# Patient Record
Sex: MALE | Race: OTHER | HISPANIC OR LATINO | URBAN - METROPOLITAN AREA
[De-identification: names, ages, dates, MRNs, and addresses within clinical notes are randomized per-mention and may not be internally consistent; named-entity substitution may affect disease eponyms.]

---

## 2021-07-06 VITALS
OXYGEN SATURATION: 99 % | HEART RATE: 60 BPM | DIASTOLIC BLOOD PRESSURE: 90 MMHG | TEMPERATURE: 97 F | SYSTOLIC BLOOD PRESSURE: 166 MMHG | RESPIRATION RATE: 16 BRPM

## 2021-07-06 NOTE — H&P ADULT - NSICDXPASTMEDICALHX_GEN_ALL_CORE_FT
PAST MEDICAL HISTORY:  DM (diabetes mellitus)     H/O carotid artery stenosis     Hyperlipidemia     Hypertension     VISHNU (iron deficiency anemia)

## 2021-07-06 NOTE — H&P ADULT - HISTORY OF PRESENT ILLNESS
Skeleton  HYDRATIOn  PRE-OP TAVR/SAVR, ?SCHEDULED    Verify meds    Cardiologist: Dr. Montiel  Covid:  Escort:  Pharmacy: [facility] [address], [place], [address]    77 yo M with PMHx HTN, HLD, IDDM with hyperglycemia (HgbA1C 9.4% on 5/20/21), chronic diastolic CHF (recent hospitalization from [facility] on_____ for acute on chronic diastolic CHF exacerbation), severe AS (JESU 1.0), carotid artery stenosis s/p carotid stent x 2 on 1/7/19 in distal common carotid artery) PAD (R/L SFA 20-50%, R/L pop 20-50% by LE arterial duplex 3/31/21), CKD (Cr. 3.3 5/20/21), VISHNU (H/H 9.4/23.3, total iron 48, %iron sat 14.2% on 4/1/21), hypothyroidism who presented to Cardiologist Dr. Montiel c/o non-radiating SSCP described as __ and of _/10 intensity with associated fatigue/IRIZARRY/weakness upon ambulation of ___ city blocks, resolving with rest, over past ____.     Denies dizziness, diaphoresis, palpitations, LE edema, orthopnea, PND, syncope, N/V, abdominal pain.     ECHO 3/31/21: LVEF 53%, mild AI/PA, probable LV diastolic dysfunction of impaired relaxation type, AS with mod-severe AS (JESU 1.0cm2). Carotid duplex 3/31/21: ANGEL LUIS 16-49% in ANGEL LUIS, 16-49% in LICA.     In light of pt's risk factors, CCS Anginal Class ___ Sx, pt referred for [facility] for pre-op w/u for possible TAVR/SAVR.  Verify meds    History obtained via Deena Rileyginger (daughter, reliable)  WILL NEED HYDRATION *** Alerted GLADIS Hobson  PRE-OP TAVR/SAVR, NOT SCHEDULED    Cardiologist: Dr. Montiel  Covid: Vaccinated x2 (3/2021)  Escort: Daughter  Pharmacy: [facility] [address], [place], [address]    75 yo M with PMHx HTN, HLD, IDDM with hyperglycemia (HgbA1C 9.4% on 5/20/21),  CKD stage unknown (Cr 2.35 on 3/12/21, Cr 3.3 5/20/21, Cr 4.26 6/25/21), chronic diastolic CHF (recent hospitalization @ [facility] 6/2021 for acute on chronic diastolic CHF exacerbation & VERONICA on CKD s/p IV diuresis), severe AS (JESU 1.0cm2), carotid artery stenosis s/p carotid stent x 2 in distal common carotid artery on 1/7/19), PAD (R/L SFA 20-50%, R/L pop 20-50% by LE arterial duplex 3/31/21), VISHNU of unclear etiology (H/H 9.4/23.3, total iron 48, %iron sat 14.2% on 4/1/21, s/p 1uPRBCs during previous hospitalization for presumed anemia of chronic kidney disease, last colonoscopy 5 years ago which was unremarkable per daughter), hypothyroidism who presented to Cardiologist Dr. Montiel c/o SOB occurring independent of exertion prior to hospitalization but this has now resolved s/p IV diuresis. Pt endorses feeling of generalized fatigue and intermittent LE edema but this has improved since hospitalization. Denies dizziness, diaphoresis, palpitations, orthopnea, PND, syncope, N/V, abdominal pain, BRBPR, melena.  ECHO 3/31/21: LVEF 53%, mild AI/SC, probable LV diastolic dysfunction of impaired relaxation type, AS with mod-severe AS (JESU 1.0cm2). Carotid duplex 3/31/21: ANGEL LUIS 16-49% in ANGEL LUIS, 16-49% in LICA. In light of pt's risk factors, pt referred for [facility] for pre-op w/u for possible TAVR/SAVR.      History obtained via Deena Wolfe (daughter, reliable)  WILL NEED HYDRATION *** Alerted GLADIS Hobson  PRE-OP TAVR/SAVR, NOT SCHEDULED    Cardiologist: Dr. Montiel  Covid: Vaccinated x2 (3/2021)  Escort: Daughter  Pharmacy: [facility] [address], [place], [address]    75 yo M with PMHx HTN, HLD, IDDM with hyperglycemia (HgbA1C 9.4% on 5/20/21),  CKD stage unknown (Cr 2.35 on 3/12/21, Cr 3.3 5/20/21, Cr 4.26 6/25/21), chronic diastolic CHF (recent hospitalization @ [facility] 6/2021 for acute on chronic diastolic CHF exacerbation & VERONICA on CKD s/p IV diuresis), severe AS (JESU 1.0cm2), carotid artery stenosis s/p carotid stent x 2 in distal common carotid artery on 1/7/19), PAD (R/L SFA 20-50%, R/L pop 20-50% by LE arterial duplex 3/31/21), VISHNU of unclear etiology (H/H 9.4/23.3, total iron 48, %iron sat 14.2% on 4/1/21, s/p 1uPRBCs during previous hospitalization for presumed anemia of chronic kidney disease, last colonoscopy 5 years ago which was unremarkable per daughter), hypothyroidism who presented to Cardiologist Dr. Montiel c/o SOB occurring independent of exertion prior to hospitalization but this has now resolved s/p IV diuresis. Pt endorses feeling of generalized fatigue and intermittent LE edema but this has improved since hospitalization. Denies dizziness, diaphoresis, palpitations, orthopnea, PND, syncope, N/V, abdominal pain, BRBPR, melena.  ECHO 3/31/21: LVEF 53%, mild AI/HI, probable LV diastolic dysfunction of impaired relaxation type, AS with mod-severe AS (JESU 1.0cm2). Carotid duplex 3/31/21: ANGEL LUIS 16-49% in ANGEL LUIS, 16-49% in LICA. In light of pt's risk factors, pt referred for [facility] for pre-op w/u for possible TAVR/SAVR.      History obtained via Deena Wolfe (daughter, reliable)  WILL NEED HYDRATION *** Alerted GLADIS Hobson  PRE-OP TAVR/SAVR, NOT SCHEDULED    Cardiologist: Dr. Montiel  Covid: Vaccinated x2 (3/2021)  Escort: Daughter  Pharmacy: [facility] [address] [place], [address]    75 y/o Frisian/English Speaking M with PMHx HTN, HLD, IDDM with hyperglycemia (HgbA1C 9.4% on 5/20/21),  CKD stage unknown (Cr 2.35 on 3/12/21, Cr 3.3 5/20/21, Cr 4.26 6/25/21), chronic diastolic CHF (recent hospitalization @ [facility] 6/2021 for acute on chronic diastolic CHF exacerbation & VERONICA on CKD s/p IV diuresis), severe AS (JESU 1.0cm2), carotid artery stenosis s/p carotid stent x 2 in distal common carotid artery on 1/7/19), PAD (R/L SFA 20-50%, R/L pop 20-50% by LE arterial duplex 3/31/21), VISHNU of unclear etiology (H/H 9.4/23.3, total iron 48, %iron sat 14.2% on 4/1/21, s/p 1uPRBCs during previous hospitalization for presumed anemia of chronic kidney disease, last colonoscopy 5 years ago which was unremarkable per daughter), hypothyroidism who presented to Cardiologist Dr. Montiel c/o SOB occurring independent of exertion prior to hospitalization but this has now resolved s/p IV diuresis. Pt endorses feeling of generalized fatigue and intermittent LE edema but this has improved since hospitalization. Denies dizziness, diaphoresis, palpitations, orthopnea, PND, syncope, N/V, abdominal pain, BRBPR, melena.  ECHO 3/31/21: LVEF 53%, mild AI/KY, probable LV diastolic dysfunction of impaired relaxation type, AS with mod-severe AS (JESU 1.0cm2). Carotid duplex 3/31/21: ANGEL LUIS 16-49% in ANGEL LUIS, 16-49% in LICA. In light of pt's risk factors, pt referred for [facility] for pre-op w/u for possible TAVR/SAVR.      History obtained via Deena Wolfe (daughter, reliable)  PRE-OP TAVR/SAVR, NOT SCHEDULED    Cardiologist: Dr. Montiel  Covid: Vaccinated x2 (3/2021)  Escort: Daughter  Pharmacy: [facility] [address] [place], [address]    75 y/o Kazakh/English Speaking M with PMHx HTN, HLD, IDDM with hyperglycemia (HgbA1C 9.4% on 5/20/21),  CKD stage unknown (Cr 2.35 on 3/12/21, Cr 3.3 5/20/21, Cr 4.26 6/25/21), chronic diastolic CHF (recent hospitalization @ [facility] 6/2021 for acute on chronic diastolic CHF exacerbation & VERONICA on CKD s/p IV diuresis), severe AS (JESU 1.0cm2), carotid artery stenosis s/p carotid stent x 2 in distal common carotid artery on 1/7/19), PAD (R/L SFA 20-50%, R/L pop 20-50% by LE arterial duplex 3/31/21), VISHNU of unclear etiology (H/H 9.4/23.3, total iron 48, %iron sat 14.2% on 4/1/21, s/p 1uPRBCs during previous hospitalization for presumed anemia of chronic kidney disease, last colonoscopy 5 years ago which was unremarkable per daughter), hypothyroidism who presented to Cardiologist Dr. Montiel c/o SOB occurring independent of exertion prior to hospitalization but this has now resolved s/p IV diuresis. Pt endorses feeling of generalized fatigue and intermittent LE edema but this has improved since hospitalization. Denies dizziness, diaphoresis, palpitations, orthopnea, PND, syncope, N/V, abdominal pain, BRBPR, melena.  ECHO 3/31/21: LVEF 53%, mild AI/MI, probable LV diastolic dysfunction of impaired relaxation type, AS with mod-severe AS (JESU 1.0cm2). Carotid duplex 3/31/21: ANGEL LUIS 16-49% in ANGEL LUIS, 16-49% in LICA. In light of pt's risk factors, pt referred for [facility] for pre-op w/u for possible TAVR/SAVR.

## 2021-07-06 NOTE — H&P ADULT - ASSESSMENT
75 yo M with PMHx HTN, HLD, IDDM with hyperglycemia (HgbA1C 9.4% on 5/20/21),  CKD stage unknown (Cr 2.35 on 3/12/21, Cr 3.3 5/20/21, Cr 4.26 6/25/21), chronic diastolic CHF (recent hospitalization @ [facility] 6/2021 for acute on chronic diastolic CHF exacerbation & VERONICA on CKD s/p IV diuresis), severe AS (JESU 1.0cm2), carotid artery stenosis s/p carotid stent x 2 in distal common carotid artery on 1/7/19), PAD (R/L SFA 20-50%, R/L pop 20-50% by LE arterial duplex 3/31/21), VISHNU of unclear etiology (H/H 9.4/23.3, total iron 48, %iron sat 14.2% on 4/1/21, s/p 1uPRBCs during previous hospitalization for presumed anemia of chronic kidney disease, last colonoscopy 5 years ago which was unremarkable per daughter), hypothyroidism who is referred for [facility] for pre-op w/u for possible TAVR/SAVR.     H/H . Pt denies bleeding, GI bleeding, hematemesis, hematuria, BRBPR or melena . ASA … and Plavix … pre-cath.  Cr. IV NS@ cc/hr pre-cath.    Risks & benefits of procedure and alternative therapy have been explained to the patient including but not limited to: allergic reaction, bleeding w/possible need for blood transfusion, infection, renal and vascular compromise, limb damage, arrhythmia, stroke, vessel dissection/perforation, Myocardial infarction, emergent CABG. Informed consent obtained and in chart   77 yo Moldovan/English M with PMHx HTN, HLD, IDDM with hyperglycemia (HgbA1C 9.4% on 5/20/21),  CKD stage unknown (Cr 2.35 on 3/12/21, Cr 3.3 5/20/21, Cr 4.26 6/25/21), chronic diastolic CHF (recent hospitalization @ [facility] 6/2021 for acute on chronic diastolic CHF exacerbation & VERONICA on CKD s/p IV diuresis), severe AS (JESU 1.0cm2), carotid artery stenosis s/p carotid stent x 2 in distal common carotid artery on 1/7/19), PAD (R/L SFA 20-50%, R/L pop 20-50% by LE arterial duplex 3/31/21), VISHNU of unclear etiology (H/H 9.4/23.3, total iron 48, %iron sat 14.2% on 4/1/21, s/p 1uPRBCs during previous hospitalization for presumed anemia of chronic kidney disease, last colonoscopy 5 years ago which was unremarkable per daughter), hypothyroidism who is referred for [facility] for pre-op w/u for possible TAVR/SAVR.     H/H 10.7/32.4. Pt denies bleeding, GI bleeding, hematemesis, hematuria, BRBPR or melena .  Pt. reports being compliant with home DAPT. Pt. given ASA 81 mg PO X 1 and Plavix 75 mg PO x 1 pre-cath.  Cr. 4.03; no  fluids given 2/2 severe AS  ECHO ordered as per Dr. Cole. f/u     Risks & benefits of procedure and alternative therapy have been explained to the patient including but not limited to: allergic reaction, bleeding w/possible need for blood transfusion, infection, renal and vascular compromise, limb damage, arrhythmia, stroke, vessel dissection/perforation, Myocardial infarction, emergent CABG. Informed consent obtained and in chart   77 yo Citizen of [place]/English M with PMHx HTN, HLD, IDDM with hyperglycemia (HgbA1C 9.4% on 5/20/21),  CKD stage unknown (Cr 2.35 on 3/12/21, Cr 3.3 5/20/21, Cr 4.26 6/25/21), chronic diastolic CHF (recent hospitalization @ [facility] 6/2021 for acute on chronic diastolic CHF exacerbation & VERONICA on CKD s/p IV diuresis), severe AS (JESU 1.0cm2), carotid artery stenosis s/p carotid stent x 2 in distal common carotid artery on 1/7/19), PAD (R/L SFA 20-50%, R/L pop 20-50% by LE arterial duplex 3/31/21), VISHNU of unclear etiology (H/H 9.4/23.3, total iron 48, %iron sat 14.2% on 4/1/21, s/p 1uPRBCs during previous hospitalization for presumed anemia of chronic kidney disease, last colonoscopy 5 years ago which was unremarkable per daughter), hypothyroidism who is referred for [facility] for pre-op w/u for possible TAVR/SAVR.     H/H 10.7/32.4. Pt denies bleeding, GI bleeding, hematemesis, hematuria, BRBPR or melena .  Pt. reports being compliant with home DAPT. Pt. given ASA 81 mg PO X 1 and Plavix 75 mg PO x 1 pre-cath.  Cr. 4.03; no  fluids given 2/2 severe AS  ECHO ordered as per Dr. Cole. f/u   WBC 11; pt afebrile and denies any URI/UTI sx    Risks & benefits of procedure and alternative therapy have been explained to the patient including but not limited to: allergic reaction, bleeding w/possible need for blood transfusion, infection, renal and vascular compromise, limb damage, arrhythmia, stroke, vessel dissection/perforation, Myocardial infarction, emergent CABG. Informed consent obtained and in chart

## 2021-07-06 NOTE — H&P ADULT - NSHPLABSRESULTS_GEN_ALL_CORE
10.7   11.35 )-----------( 297      ( 07 Jul 2021 08:12 )             32.4   07-07    144  |  100  |  105<H>  ----------------------------<  76  4.1   |  28  |  4.03<H>    Ca    10.2      07 Jul 2021 08:12  Mg     2.1     07-07    TPro  8.1  /  Alb  4.5  /  TBili  0.4  /  DBili  x   /  AST  34  /  ALT  43  /  AlkPhos  168<H>  07-07  PT/INR - ( 07 Jul 2021 08:12 )   PT: 12.0 sec;   INR: 1.00          PTT - ( 07 Jul 2021 08:12 )  PTT:32.3 sec

## 2021-07-07 ENCOUNTER — OUTPATIENT (OUTPATIENT)
Dept: OUTPATIENT SERVICES | Facility: HOSPITAL | Age: 76
LOS: 1 days | Discharge: HOME | End: 2021-07-07
Payer: MEDICARE

## 2021-07-07 DIAGNOSIS — Z86.79 PERSONAL HISTORY OF OTHER DISEASES OF THE CIRCULATORY SYSTEM: Chronic | ICD-10-CM

## 2021-07-07 LAB
A1C WITH ESTIMATED AVERAGE GLUCOSE RESULT: 7.3 % — HIGH (ref 4–5.6)
ALBUMIN SERPL ELPH-MCNC: 4.5 G/DL — SIGNIFICANT CHANGE UP (ref 3.3–5)
ALP SERPL-CCNC: 168 U/L — HIGH (ref 40–120)
ALT FLD-CCNC: 43 U/L — SIGNIFICANT CHANGE UP (ref 10–45)
ANION GAP SERPL CALC-SCNC: 16 MMOL/L — SIGNIFICANT CHANGE UP (ref 5–17)
APTT BLD: 32.3 SEC — SIGNIFICANT CHANGE UP (ref 27.5–35.5)
AST SERPL-CCNC: 34 U/L — SIGNIFICANT CHANGE UP (ref 10–40)
BASOPHILS # BLD AUTO: 0.06 K/UL — SIGNIFICANT CHANGE UP (ref 0–0.2)
BASOPHILS NFR BLD AUTO: 0.5 % — SIGNIFICANT CHANGE UP (ref 0–2)
BILIRUB SERPL-MCNC: 0.4 MG/DL — SIGNIFICANT CHANGE UP (ref 0.2–1.2)
BUN SERPL-MCNC: 105 MG/DL — HIGH (ref 7–23)
CALCIUM SERPL-MCNC: 10.2 MG/DL — SIGNIFICANT CHANGE UP (ref 8.4–10.5)
CHLORIDE SERPL-SCNC: 100 MMOL/L — SIGNIFICANT CHANGE UP (ref 96–108)
CHOLEST SERPL-MCNC: 170 MG/DL — SIGNIFICANT CHANGE UP
CK MB CFR SERPL CALC: 2.6 NG/ML — SIGNIFICANT CHANGE UP (ref 0–6.7)
CK SERPL-CCNC: 153 U/L — SIGNIFICANT CHANGE UP (ref 30–200)
CO2 SERPL-SCNC: 28 MMOL/L — SIGNIFICANT CHANGE UP (ref 22–31)
CREAT SERPL-MCNC: 4.03 MG/DL — HIGH (ref 0.5–1.3)
EOSINOPHIL # BLD AUTO: 0.4 K/UL — SIGNIFICANT CHANGE UP (ref 0–0.5)
EOSINOPHIL NFR BLD AUTO: 3.5 % — SIGNIFICANT CHANGE UP (ref 0–6)
ESTIMATED AVERAGE GLUCOSE: 163 MG/DL — HIGH (ref 68–114)
GLUCOSE BLDC GLUCOMTR-MCNC: 111 MG/DL — HIGH (ref 70–99)
GLUCOSE SERPL-MCNC: 76 MG/DL — SIGNIFICANT CHANGE UP (ref 70–99)
HCT VFR BLD CALC: 32.4 % — LOW (ref 39–50)
HDLC SERPL-MCNC: 55 MG/DL — SIGNIFICANT CHANGE UP
HGB BLD-MCNC: 10.7 G/DL — LOW (ref 13–17)
IMM GRANULOCYTES NFR BLD AUTO: 0.7 % — SIGNIFICANT CHANGE UP (ref 0–1.5)
INR BLD: 1 — SIGNIFICANT CHANGE UP (ref 0.88–1.16)
LIPID PNL WITH DIRECT LDL SERPL: 97 MG/DL — SIGNIFICANT CHANGE UP
LYMPHOCYTES # BLD AUTO: 1.3 K/UL — SIGNIFICANT CHANGE UP (ref 1–3.3)
LYMPHOCYTES # BLD AUTO: 11.5 % — LOW (ref 13–44)
MAGNESIUM SERPL-MCNC: 2.1 MG/DL — SIGNIFICANT CHANGE UP (ref 1.6–2.6)
MCHC RBC-ENTMCNC: 30.5 PG — SIGNIFICANT CHANGE UP (ref 27–34)
MCHC RBC-ENTMCNC: 33 GM/DL — SIGNIFICANT CHANGE UP (ref 32–36)
MCV RBC AUTO: 92.3 FL — SIGNIFICANT CHANGE UP (ref 80–100)
MONOCYTES # BLD AUTO: 0.86 K/UL — SIGNIFICANT CHANGE UP (ref 0–0.9)
MONOCYTES NFR BLD AUTO: 7.6 % — SIGNIFICANT CHANGE UP (ref 2–14)
NEUTROPHILS # BLD AUTO: 8.65 K/UL — HIGH (ref 1.8–7.4)
NEUTROPHILS NFR BLD AUTO: 76.2 % — SIGNIFICANT CHANGE UP (ref 43–77)
NON HDL CHOLESTEROL: 115 MG/DL — SIGNIFICANT CHANGE UP
NRBC # BLD: 0 /100 WBCS — SIGNIFICANT CHANGE UP (ref 0–0)
PLATELET # BLD AUTO: 297 K/UL — SIGNIFICANT CHANGE UP (ref 150–400)
POTASSIUM SERPL-MCNC: 4.1 MMOL/L — SIGNIFICANT CHANGE UP (ref 3.5–5.3)
POTASSIUM SERPL-SCNC: 4.1 MMOL/L — SIGNIFICANT CHANGE UP (ref 3.5–5.3)
PROT SERPL-MCNC: 8.1 G/DL — SIGNIFICANT CHANGE UP (ref 6–8.3)
PROTHROM AB SERPL-ACNC: 12 SEC — SIGNIFICANT CHANGE UP (ref 10.6–13.6)
RBC # BLD: 3.51 M/UL — LOW (ref 4.2–5.8)
RBC # FLD: 12.7 % — SIGNIFICANT CHANGE UP (ref 10.3–14.5)
SODIUM SERPL-SCNC: 144 MMOL/L — SIGNIFICANT CHANGE UP (ref 135–145)
TRIGL SERPL-MCNC: 92 MG/DL — SIGNIFICANT CHANGE UP
WBC # BLD: 11.35 K/UL — HIGH (ref 3.8–10.5)
WBC # FLD AUTO: 11.35 K/UL — HIGH (ref 3.8–10.5)

## 2021-07-07 PROCEDURE — 85025 COMPLETE CBC W/AUTO DIFF WBC: CPT

## 2021-07-07 PROCEDURE — 80061 LIPID PANEL: CPT

## 2021-07-07 PROCEDURE — 93005 ELECTROCARDIOGRAM TRACING: CPT

## 2021-07-07 PROCEDURE — 83036 HEMOGLOBIN GLYCOSYLATED A1C: CPT

## 2021-07-07 PROCEDURE — 93454 CORONARY ARTERY ANGIO S&I: CPT | Mod: 26

## 2021-07-07 PROCEDURE — 82550 ASSAY OF CK (CPK): CPT

## 2021-07-07 PROCEDURE — 82962 GLUCOSE BLOOD TEST: CPT

## 2021-07-07 PROCEDURE — C1887: CPT

## 2021-07-07 PROCEDURE — C1769: CPT

## 2021-07-07 PROCEDURE — 80053 COMPREHEN METABOLIC PANEL: CPT

## 2021-07-07 PROCEDURE — 85730 THROMBOPLASTIN TIME PARTIAL: CPT

## 2021-07-07 PROCEDURE — 99203 OFFICE O/P NEW LOW 30 MIN: CPT

## 2021-07-07 PROCEDURE — 93306 TTE W/DOPPLER COMPLETE: CPT | Mod: 26

## 2021-07-07 PROCEDURE — 83735 ASSAY OF MAGNESIUM: CPT

## 2021-07-07 PROCEDURE — C1894: CPT

## 2021-07-07 PROCEDURE — 93306 TTE W/DOPPLER COMPLETE: CPT

## 2021-07-07 PROCEDURE — 93010 ELECTROCARDIOGRAM REPORT: CPT

## 2021-07-07 PROCEDURE — 85610 PROTHROMBIN TIME: CPT

## 2021-07-07 PROCEDURE — 82553 CREATINE MB FRACTION: CPT

## 2021-07-07 PROCEDURE — 93454 CORONARY ARTERY ANGIO S&I: CPT

## 2021-07-07 RX ORDER — LABETALOL HCL 100 MG
1 TABLET ORAL
Qty: 0 | Refills: 0 | DISCHARGE

## 2021-07-07 RX ORDER — CLOPIDOGREL BISULFATE 75 MG/1
75 TABLET, FILM COATED ORAL ONCE
Refills: 0 | Status: COMPLETED | OUTPATIENT
Start: 2021-07-07 | End: 2021-07-07

## 2021-07-07 RX ORDER — INSULIN DEGLUDEC 100 U/ML
0 INJECTION, SOLUTION SUBCUTANEOUS
Qty: 0 | Refills: 0 | DISCHARGE

## 2021-07-07 RX ORDER — HYDRALAZINE HCL 50 MG
1 TABLET ORAL
Qty: 0 | Refills: 0 | DISCHARGE

## 2021-07-07 RX ORDER — CHLORHEXIDINE GLUCONATE 213 G/1000ML
1 SOLUTION TOPICAL ONCE
Refills: 0 | Status: DISCONTINUED | OUTPATIENT
Start: 2021-07-07 | End: 2021-07-07

## 2021-07-07 RX ORDER — ASPIRIN/CALCIUM CARB/MAGNESIUM 324 MG
81 TABLET ORAL ONCE
Refills: 0 | Status: COMPLETED | OUTPATIENT
Start: 2021-07-07 | End: 2021-07-07

## 2021-07-07 RX ADMIN — Medication 81 MILLIGRAM(S): at 10:11

## 2021-07-07 RX ADMIN — CLOPIDOGREL BISULFATE 75 MILLIGRAM(S): 75 TABLET, FILM COATED ORAL at 10:16

## 2021-07-07 NOTE — PROGRESS NOTE ADULT - SUBJECTIVE AND OBJECTIVE BOX
Interventional Cardiology [facility]CH [facility] Discharge Note    Patient without complaints. Ambulated and voided without difficulties    Afebrile, VSS    Ext:    		Right  Radial : no   hematoma,   no  bleeding, dressing; C/D/I      Pulses:    intact RAD to baseline     A/P:  77 yo Syriac/English M with PMHx HTN, HLD, IDDM with hyperglycemia (HgbA1C 9.4% on 5/20/21),  CKD stage unknown (Cr 2.35 on 3/12/21, Cr 3.3 5/20/21, Cr 4.26 6/25/21), chronic diastolic CHF (recent hospitalization @ [facility] 6/2021 for acute on chronic diastolic CHF exacerbation & VERONICA on CKD s/p IV diuresis), severe AS (JESU 1.0cm2), carotid artery stenosis s/p carotid stent x 2 in distal common carotid artery on 1/7/19), PAD (R/L SFA 20-50%, R/L pop 20-50% by LE arterial duplex 3/31/21), VISHNU of unclear etiology (H/H 9.4/23.3, total iron 48, %iron sat 14.2% on 4/1/21, s/p 1uPRBCs during previous hospitalization for presumed anemia of chronic kidney disease, last colonoscopy 5 years ago which was unremarkable per daughter), hypothyroidism who is referred for [facility] for pre-op w/u for possible TAVR/SAVR.       Pt now s/p diagnostic cardiac catheterization on 7/7/21 revealing prox RCA 80% OM2 70% small to medium sized. Otherwise nonobstructive CAD. Right radial access.     1.	Stable for discharge as per attending Dr. Mosher after bed rest, pt voids, groin/wrist stable and 30 minutes of ambulation.  2.	Follow-up with PMD/Cardiologist Dr Cole in 1-2 weeks  3.	Discharged forms signed and copies in chart

## 2021-07-07 NOTE — PROGRESS NOTE ADULT - SUBJECTIVE AND OBJECTIVE BOX
Interventional Cardiology Radial band Removal Note    s/p Heparin 	    Pt without complaints.  VSS.    Right Radial access site D-Stat Hemoband in place, no hematoma, no bled  Radial pulse: +2    Hemostasis achieved with manual release of hemoband.    __no_  Vasovagal reaction.    Meds given:    Right/Left Radial access site  _no____ hematoma, ____no__ bleed  Radial pulse: +2    A/P:  s/p Dx Coronary Angiogram  -	continue to monitor  -	-OOB as tolerated  -	Post Procedure Instructions given  -                   Call [phone] if any access site issues. Interventional Cardiology Radial band Removal Note    s/p Heparin 	    Pt without complaints.  VSS.    Right Radial access site D-Stat Hemoband in place, no hematoma, no bled  Radial pulse: +2    Hemostasis achieved with manual release of hemoband.    __no_  Vasovagal reaction.    Meds given: none    Right Radial access site  _no____ hematoma, ____no__ bleed  Radial pulse: +2    A/P:  s/p Dx Coronary Angiogram  -	continue to monitor  -	-OOB as tolerated  -	Post Procedure Instructions given  -                   Call [phone] if any access site issues.

## 2021-07-07 NOTE — CONSULT NOTE ADULT - ASSESSMENT
77 y/o M with CKD IV, HTN, HLD< IDDM, PAD, ROWENA, chronic diastolic heart failure and severe AS.  Given advanced CKD, pt is high risk for dialysis. He will need Structural CT scan, and will plan for that at [place] pending Renal input.  Given age/comorbidities pt is high risk for surgery, so feel that pursuing TAVR workup is warranted.  -Structural CT scan  -obtain carotid duplex from Tiana's office  -We discussed options for valvular intervention.  -WIll discuss with Renal, Dr. Mosher and Dr. Montiel  >80 min spent

## 2021-07-07 NOTE — CONSULT NOTE ADULT - SUBJECTIVE AND OBJECTIVE BOX
Patient is a 76y old  Male who presents with a chief complaint of cardiac catheterization (07 Jul 2021 14:01)      HPI:      History obtained via Deenaariel Goinskevinginger (daughter, reliable)  PRE-OP TAVR/SAVR, NOT SCHEDULED    Cardiologist: Dr. Montiel  Covid: Vaccinated x2 (3/2021)  Escort: Daughter  Pharmacy: [facility] [address] [place], [address]    75 y/o Greek/English Speaking M with PMHx HTN, HLD, IDDM, CKD (Stage IV or V, Creatinine ~4), chronic diastolic CHF (recent hospitalization @ [facility] 6/2021 for acute on chronic diastolic CHF exacerbation & VERONICA on CKD s/p IV diuresis), severe AS, carotid artery stenosis s/p carotid stent x 2 in distal common carotid artery on 1/7/19), PAD (R/L SFA 20-50%, R/L pop 20-50% by LE arterial duplex 3/31/21), anemia, hypothyroidism who presented with NYHA III symptoms. Pt endorses feeling of generalized fatigue and intermittent LE edema but this has improved since hospitalization. Denies dizziness, diaphoresis, palpitations, orthopnea, PND, syncope, N/V, abdominal pain, BRBPR, melena.  ECHO 3/31/21: LVEF 53%, mild AI/LA, probable LV diastolic dysfunction of impaired relaxation type, AS with mod-severe AS (JESU 1.0cm2). Carotid duplex 3/31/21: ANGEL LUIS 16-49% in ANGEL LUIS, 16-49% in LICA. In light of pt's risk factors, pt referred for [facility] for pre-op w/u for possible TAVR/SAVR.  He is s/p cardiac cath today revealing prox RCA 80% OM2 70% small to medium sized. Otherwise nonobstructive CAD. Right radial access.     PAST MEDICAL & SURGICAL HISTORY:  Hypertension    Hyperlipidemia    DM (diabetes mellitus)    H/O carotid artery stenosis    VISHNU (iron deficiency anemia)    H/O carotid artery stenosis  s/p carotid stent x 2 in 2019        Review of system:  As per HPI    No Known Allergies      FAMILY HISTORY:  FH: diabetes mellitus          Vital Signs Last 24 Hrs   T/HR/RR/BP:  · Temp (F)	96.9 Degrees F  · Temp (C)	36.1 Degrees C  · Heart Rate	60 /min  · Respiration Rate (breaths/min)	16 /min  · BP Systolic	166 mm Hg  · BP Diastolic	90 mm Hg  · BP Noninvasive Mean	115 mm Hg  · SpO2 (%)	99 %  · O2 Delivery/Oxygen Delivery Method	room air       Physical Exam:   GEN: NAD, AAOx3  HEENT: MMM, no icterus  CV: S1 with +BETH and soft S2  Lung: CTAB  Abd: soft NT ND +BS  Ext: no c/c/e  Neuro: no focal neuro deficit    MEDICATIONS  (STANDING):      Echo: Severe AS with JESU 0.88 PV >4 m/s    LABS:                        10.7   11.35 )-----------( 297      ( 07 Jul 2021 08:12 )             32.4     07-07    144  |  100  |  105<H>  ----------------------------<  77  4.1   |  28  |  4.03<H>    Ca    10.2      07 Jul 2021 08:12  Mg     2.1     07-07    TPro  8.1  /  Alb  4.5  /  TBili  0.4  /  DBili  x   /  AST  34  /  ALT  43  /  AlkPhos  168<H>  07-07    CARDIAC MARKERS ( 07 Jul 2021 08:12 )  x     / x     / 153 U/L / x     / 2.6 ng/mL      PT/INR - ( 07 Jul 2021 08:12 )   PT: 12.0 sec;   INR: 1.00          PTT - ( 07 Jul 2021 08:12 )  PTT:32.3 sec

## 2021-07-08 ENCOUNTER — NON-APPOINTMENT (OUTPATIENT)
Age: 76
End: 2021-07-08

## 2021-07-08 PROBLEM — Z00.00 ENCOUNTER FOR PREVENTIVE HEALTH EXAMINATION: Status: ACTIVE | Noted: 2021-07-08

## 2021-07-12 DIAGNOSIS — I25.110 ATHEROSCLEROTIC HEART DISEASE OF NATIVE CORONARY ARTERY WITH UNSTABLE ANGINA PECTORIS: ICD-10-CM

## 2021-07-12 DIAGNOSIS — I42.9 CARDIOMYOPATHY, UNSPECIFIED: ICD-10-CM

## 2021-07-12 DIAGNOSIS — I35.0 NONRHEUMATIC AORTIC (VALVE) STENOSIS: ICD-10-CM

## 2021-07-14 PROBLEM — I10 ESSENTIAL (PRIMARY) HYPERTENSION: Chronic | Status: ACTIVE | Noted: 2021-07-06

## 2021-07-14 PROBLEM — E11.9 TYPE 2 DIABETES MELLITUS WITHOUT COMPLICATIONS: Chronic | Status: ACTIVE | Noted: 2021-07-06

## 2021-07-14 PROBLEM — D50.9 IRON DEFICIENCY ANEMIA, UNSPECIFIED: Chronic | Status: ACTIVE | Noted: 2021-07-06

## 2021-07-14 PROBLEM — E78.5 HYPERLIPIDEMIA, UNSPECIFIED: Chronic | Status: ACTIVE | Noted: 2021-07-06

## 2021-07-14 PROBLEM — Z86.79 PERSONAL HISTORY OF OTHER DISEASES OF THE CIRCULATORY SYSTEM: Chronic | Status: ACTIVE | Noted: 2021-07-06

## 2021-07-15 ENCOUNTER — RESULT REVIEW (OUTPATIENT)
Age: 76
End: 2021-07-15

## 2021-07-19 ENCOUNTER — APPOINTMENT (OUTPATIENT)
Dept: HEART AND VASCULAR | Facility: CLINIC | Age: 76
End: 2021-07-19
Payer: MEDICARE

## 2021-07-19 VITALS
WEIGHT: 160 LBS | SYSTOLIC BLOOD PRESSURE: 168 MMHG | HEIGHT: 65 IN | HEART RATE: 69 BPM | OXYGEN SATURATION: 98 % | DIASTOLIC BLOOD PRESSURE: 77 MMHG | TEMPERATURE: 98.5 F | BODY MASS INDEX: 26.66 KG/M2

## 2021-07-19 DIAGNOSIS — I35.0 NONRHEUMATIC AORTIC (VALVE) STENOSIS: ICD-10-CM

## 2021-07-19 DIAGNOSIS — N18.4 CHRONIC KIDNEY DISEASE, STAGE 4 (SEVERE): ICD-10-CM

## 2021-07-19 PROCEDURE — 99214 OFFICE O/P EST MOD 30 MIN: CPT

## 2021-07-19 RX ORDER — ATORVASTATIN CALCIUM 40 MG/1
40 TABLET, FILM COATED ORAL
Refills: 0 | Status: ACTIVE | COMMUNITY

## 2021-07-19 RX ORDER — AMLODIPINE BESYLATE 10 MG/1
10 TABLET ORAL
Refills: 0 | Status: ACTIVE | COMMUNITY

## 2021-07-19 RX ORDER — LEVOTHYROXINE SODIUM 25 UG/1
25 TABLET ORAL
Refills: 0 | Status: ACTIVE | COMMUNITY

## 2021-07-19 RX ORDER — ASPIRIN 81 MG
81 TABLET, DELAYED RELEASE (ENTERIC COATED) ORAL
Refills: 0 | Status: ACTIVE | COMMUNITY

## 2021-07-20 ENCOUNTER — NON-APPOINTMENT (OUTPATIENT)
Age: 76
End: 2021-07-20

## 2021-07-20 NOTE — ASSESSMENT
[FreeTextEntry1] : 77 y/o M with Stage IV CKD (creatinine 4), carotid artery stenosis s/p carotid stent x 2 in distal common carotid artery on 1/7/19), PAD (R/L SFA 20-50%, R/L pop 20-50% by LE arterial duplex 3/31/21), anemia, hypothyroidism, HTN, HLD, NIDDM, chronic diastolic heart failure, severe aortic stenosis with NYHA III symptoms. \par -the pt is symptomatic with severe AS, and he is high risk for HD post-CT and post-intervention\par -TAVR CT reviewed and the pt is a good candidate for TF TAVR. He has calcified peripheral vessels but the diameters should be large enough to facilitate delivery for a TAVR valve. If not, we will plan on BAV and then bring back for alternate access TAVR. \par -benefits/risks of TAVR discussed including the need for HD post\par -Will plan on letting his kidneys rest for a couple weeks and bringing him in for TF TAVR in ~2-3 wks (with pre-TAVR hydration)\par -Will need to be brought in early that day and receive 1U PRBC for anemia (along with pre-hydration)\par -Telehealth with Dr. Cheatham\par -case discussed at length with pt and daughter\par -Check labs today (recent contrast load)\par

## 2021-07-20 NOTE — REASON FOR VISIT
[Structural Heart and Valve Disease] : structural heart and valve disease [FreeTextEntry1] : 75 y/o Yakut/English Speaking M with PMHx HTN, HLD, IDDM, CKD (Stage IV or V, Creatinine ~4), chronic diastolic CHF (recent hospitalization @ [facility] 6/2021 for acute on chronic diastolic CHF exacerbation & VERONICA on CKD s/p IV diuresis), severe AS, carotid artery stenosis s/p carotid stent x 2 in distal common carotid artery on 1/7/19), PAD (R/L SFA 20-50%, R/L pop 20-50% by LE arterial duplex 3/31/21), anemia, hypothyroidism who presented with NYHA III symptoms. ECHO 3/31/21: LVEF 53%, mild AI/OK, probable LV diastolic dysfunction of impaired relaxation type, AS with mod-severe AS (JESU 1.0cm2). Carotid duplex 3/31/21: ANGEL LUIS 16-49% in ANGEL LUIS, 16-49% in LICA.  He is s/p cardiac cath revealing prox RCA 80% OM2 70% small to medium sized for which Dr. Mosher advised medical management alone (small RCA). He had an echo at [facility] with a peak transvalvular velocity of >4m/s and a mean gradient near 40mmHg (with an JESU ~1cm2), with an extremely calcified appearing valve and restricted motion. After a long discussion, including his nephrologist Dr. Miller, the pt underwent TAVR CT last week (given that there was no other way to size TAVR valve/peripheral access capability).\par [place] pt feels the same, NYHA III symptoms and making 'good urine'

## 2021-07-20 NOTE — REVIEW OF SYSTEMS
[Dyspnea on exertion] : dyspnea during exertion [Fever] : no fever [Blurry Vision] : no blurred vision [Earache] : no earache [Chest Discomfort] : no chest discomfort [Lower Ext Edema] : no extremity edema [Cough] : no cough [Abdominal Pain] : no abdominal pain [Urinary Frequency] : no change in urinary frequency [Joint Pain] : no joint pain [Rash] : no rash [Confusion] : no confusion was observed [Easy Bleeding] : no tendency for easy bleeding

## 2021-07-20 NOTE — PHYSICAL EXAM
[Well Developed] : well developed [Well Nourished] : well nourished [No Acute Distress] : no acute distress [Normal Conjunctiva] : normal conjunctiva [Normal Venous Pressure] : normal venous pressure [No Carotid Bruit] : no carotid bruit [No Rub] : no rub [No Gallop] : no gallop [Clear Lung Fields] : clear lung fields [Good Air Entry] : good air entry [No Respiratory Distress] : no respiratory distress  [Soft] : abdomen soft [Non Tender] : non-tender [No Masses/organomegaly] : no masses/organomegaly [Normal Bowel Sounds] : normal bowel sounds [Normal Gait] : normal gait [No Edema] : no edema [No Cyanosis] : no cyanosis [No Clubbing] : no clubbing [No Varicosities] : no varicosities [No Rash] : no rash [No Skin Lesions] : no skin lesions [Moves all extremities] : moves all extremities [No Focal Deficits] : no focal deficits [Normal Speech] : normal speech [Alert and Oriented] : alert and oriented [Normal memory] : normal memory [de-identified] : +BETH with soft S2 consistent with severe AS

## 2021-07-23 ENCOUNTER — TRANSCRIPTION ENCOUNTER (OUTPATIENT)
Age: 76
End: 2021-07-23

## 2021-07-30 ENCOUNTER — APPOINTMENT (OUTPATIENT)
Dept: VASCULAR SURGERY | Facility: CLINIC | Age: 76
End: 2021-07-30
Payer: MEDICARE

## 2021-07-30 PROCEDURE — 99203 OFFICE O/P NEW LOW 30 MIN: CPT

## 2021-09-07 ENCOUNTER — INPATIENT (INPATIENT)
Facility: HOSPITAL | Age: 76
LOS: 3 days | Discharge: HOME | DRG: 320 | End: 2021-09-11
Attending: INTERNAL MEDICINE | Admitting: INTERNAL MEDICINE
Payer: MEDICARE

## 2021-09-07 VITALS
TEMPERATURE: 98 F | RESPIRATION RATE: 18 BRPM | HEIGHT: 65 IN | DIASTOLIC BLOOD PRESSURE: 76 MMHG | WEIGHT: 157.41 LBS | SYSTOLIC BLOOD PRESSURE: 194 MMHG | HEART RATE: 69 BPM | OXYGEN SATURATION: 94 %

## 2021-09-07 DIAGNOSIS — Z86.79 PERSONAL HISTORY OF OTHER DISEASES OF THE CIRCULATORY SYSTEM: Chronic | ICD-10-CM

## 2021-09-07 RX ORDER — SODIUM CHLORIDE 9 MG/ML
1000 INJECTION, SOLUTION INTRAVENOUS
Refills: 0 | Status: DISCONTINUED | OUTPATIENT
Start: 2021-09-07 | End: 2021-09-11

## 2021-09-07 RX ORDER — DEXTROSE 50 % IN WATER 50 %
25 SYRINGE (ML) INTRAVENOUS ONCE
Refills: 0 | Status: DISCONTINUED | OUTPATIENT
Start: 2021-09-07 | End: 2021-09-11

## 2021-09-07 RX ORDER — SODIUM CHLORIDE 9 MG/ML
3 INJECTION INTRAMUSCULAR; INTRAVENOUS; SUBCUTANEOUS EVERY 8 HOURS
Refills: 0 | Status: DISCONTINUED | OUTPATIENT
Start: 2021-09-07 | End: 2021-09-11

## 2021-09-07 RX ORDER — INSULIN DEGLUDEC 100 U/ML
14 INJECTION, SOLUTION SUBCUTANEOUS
Qty: 0 | Refills: 0 | DISCHARGE

## 2021-09-07 RX ORDER — INSULIN GLARGINE 100 [IU]/ML
8 INJECTION, SOLUTION SUBCUTANEOUS AT BEDTIME
Refills: 0 | Status: DISCONTINUED | OUTPATIENT
Start: 2021-09-08 | End: 2021-09-08

## 2021-09-07 RX ORDER — DONEPEZIL HYDROCHLORIDE 10 MG/1
10 TABLET, FILM COATED ORAL AT BEDTIME
Refills: 0 | Status: DISCONTINUED | OUTPATIENT
Start: 2021-09-07 | End: 2021-09-11

## 2021-09-07 RX ORDER — DEXTROSE 50 % IN WATER 50 %
15 SYRINGE (ML) INTRAVENOUS ONCE
Refills: 0 | Status: DISCONTINUED | OUTPATIENT
Start: 2021-09-07 | End: 2021-09-11

## 2021-09-07 RX ORDER — HYDRALAZINE HCL 50 MG
0 TABLET ORAL
Qty: 0 | Refills: 0 | DISCHARGE

## 2021-09-07 RX ORDER — DEXTROSE 50 % IN WATER 50 %
12.5 SYRINGE (ML) INTRAVENOUS ONCE
Refills: 0 | Status: DISCONTINUED | OUTPATIENT
Start: 2021-09-07 | End: 2021-09-11

## 2021-09-07 RX ORDER — FUROSEMIDE 40 MG
40 TABLET ORAL DAILY
Refills: 0 | Status: DISCONTINUED | OUTPATIENT
Start: 2021-09-08 | End: 2021-09-11

## 2021-09-07 RX ORDER — INSULIN LISPRO 100/ML
VIAL (ML) SUBCUTANEOUS
Refills: 0 | Status: DISCONTINUED | OUTPATIENT
Start: 2021-09-07 | End: 2021-09-11

## 2021-09-07 RX ORDER — CHOLECALCIFEROL (VITAMIN D3) 125 MCG
1 CAPSULE ORAL
Qty: 0 | Refills: 0 | DISCHARGE

## 2021-09-07 RX ORDER — FUROSEMIDE 40 MG
2 TABLET ORAL
Qty: 0 | Refills: 0 | DISCHARGE

## 2021-09-07 RX ORDER — PANTOPRAZOLE SODIUM 20 MG/1
40 TABLET, DELAYED RELEASE ORAL
Refills: 0 | Status: DISCONTINUED | OUTPATIENT
Start: 2021-09-07 | End: 2021-09-11

## 2021-09-07 RX ORDER — HEPARIN SODIUM 5000 [USP'U]/ML
5000 INJECTION INTRAVENOUS; SUBCUTANEOUS EVERY 8 HOURS
Refills: 0 | Status: DISCONTINUED | OUTPATIENT
Start: 2021-09-07 | End: 2021-09-11

## 2021-09-07 RX ORDER — LEVOTHYROXINE SODIUM 125 MCG
25 TABLET ORAL DAILY
Refills: 0 | Status: DISCONTINUED | OUTPATIENT
Start: 2021-09-07 | End: 2021-09-11

## 2021-09-07 RX ORDER — METOPROLOL TARTRATE 50 MG
50 TABLET ORAL EVERY 6 HOURS
Refills: 0 | Status: DISCONTINUED | OUTPATIENT
Start: 2021-09-07 | End: 2021-09-11

## 2021-09-07 RX ORDER — CLOPIDOGREL BISULFATE 75 MG/1
1 TABLET, FILM COATED ORAL
Qty: 0 | Refills: 0 | DISCHARGE

## 2021-09-07 RX ORDER — GLUCAGON INJECTION, SOLUTION 0.5 MG/.1ML
1 INJECTION, SOLUTION SUBCUTANEOUS ONCE
Refills: 0 | Status: DISCONTINUED | OUTPATIENT
Start: 2021-09-07 | End: 2021-09-11

## 2021-09-07 RX ORDER — ASPIRIN/CALCIUM CARB/MAGNESIUM 324 MG
81 TABLET ORAL DAILY
Refills: 0 | Status: DISCONTINUED | OUTPATIENT
Start: 2021-09-07 | End: 2021-09-11

## 2021-09-07 RX ORDER — METOPROLOL TARTRATE 50 MG
0 TABLET ORAL
Qty: 0 | Refills: 0 | DISCHARGE

## 2021-09-07 RX ORDER — AMLODIPINE BESYLATE 2.5 MG/1
10 TABLET ORAL DAILY
Refills: 0 | Status: DISCONTINUED | OUTPATIENT
Start: 2021-09-07 | End: 2021-09-11

## 2021-09-07 RX ORDER — ATORVASTATIN CALCIUM 80 MG/1
40 TABLET, FILM COATED ORAL AT BEDTIME
Refills: 0 | Status: DISCONTINUED | OUTPATIENT
Start: 2021-09-07 | End: 2021-09-11

## 2021-09-07 NOTE — H&P ADULT - NSHPLABSRESULTS_GEN_ALL_CORE
Labs from 9/7 at [facility] reviewed and in HIE     < from: TTE Echo Complete w/o Contrast w/ Doppler (07.07.21 @ 09:11) >  CONCLUSIONS:     1. Normal left ventricular size and systolic function.   2. Normal right ventricular size and systolic function.   3. Dilated left atrium.   4. Moderate aortic stenosis. The peak transvalvular velocity is 3.86 m/s, the mean transvalvular gradient is 28.00 mmHg, and the LVOT/AV velocity ratio is 0.33. The aortic valve area (estimated via the continuity method) is 1.12 cm².   5. Mild-to-moderate tricuspid regurgitation.   6. Pulmonary hypertension present, pulmonary artery systolicpressure is 48 mmHg.   7. No pericardial effusion.   8. No prior echo is available for comparison.  < end of copied text >

## 2021-09-07 NOTE — H&P ADULT - HISTORY OF PRESENT ILLNESS
77 yo [place]an/English speaking M with PMHx HTN, HLD, IDDM, CKD (Stage IV, baseline Cr 2.5-3), carotid artery stenosis s/p carotid stents, PAD, anemia, hypothyroid, CAD, chronic dCHF, and severe AS who is transferred from [facility] for further evaluation of his valvular disease.  Patient had a recent hospitalization in July for an acute on chronic heart failure exacerbation.  At that time he underwent cardiac cath and echo cardiogram and he was diagnosed with severe AS.  He had cardiac cath and structural CT scans for TAVR work up after which he developed VERONICA and his Cr bumped to 8 but never required RRT.  He eventually was discharged home and returns to hospital a couple of days ago with fluid overload.  He was admitted for acute on chronic dCHF exacerbation, diuresed with IV lasix and symptoms and swelling improved.  He is now transferred for possible TAVR.  He is seen at the bedside with no complaints.  he denies HA, dizziness, CP, current SOB, orthopnea, PND, N/V/D, or LE swelling.

## 2021-09-07 NOTE — H&P ADULT - NSHPPHYSICALEXAM_GEN_ALL_CORE
Vital Signs Last 24 Hrs  T(C): 36.9 (07 Sep 2021 22:14), Max: 36.9 (07 Sep 2021 22:14)  T(F): 98.4 (07 Sep 2021 22:14), Max: 98.4 (07 Sep 2021 22:14)  HR: 69 (07 Sep 2021 22:14) (69 - 69)  BP: 194/76 (07 Sep 2021 22:14) (194/76 - 194/76)  BP(mean): 109 (07 Sep 2021 22:14) (109 - 109)  RR: 18 (07 Sep 2021 22:14) (18 - 18)  SpO2: 94% (07 Sep 2021 22:14) (94% - 94%)    Appearance: No acute distress.  Neurologic: AAOx3, no AMS or focal deficits.  Responds appropriately to verbal and physical stimuli; exhibits purposeful movement in all extremities.  HEENT:   MMM, PERRLA, EOMI b/l  Neck: Supple, no JVD; no Carotid Bruit   Cardiovascular: RRR, S1 S2. No m/r/g appreciated  Respiratory: No acute respiratory distress. CTA b/l, no w/r/r.   Gastrointestinal:  Soft, non-tender, non-distended, + BS.	  Skin: No rashes. No ecchymoses. No cyanosis.  Extremities: Exhibits normal range of motion, no clubbing, cyanosis or edema.  Vascular: Peripheral pulses palpable 2+ bilaterally.

## 2021-09-07 NOTE — H&P ADULT - NSHPREVIEWOFSYSTEMS_GEN_ALL_CORE
Review of Systems  CONSTITUTIONAL:  Denies Fevers / chills, sweats, fatigue, weight loss, weight gain                                      NEURO:  Denies paraesthesias, seizures, syncope, confusion                                                                                EYES:  Denies Blurry vision, discharge, pain, loss of vision                                                                                    ENMT:  Denies Difficulty hearing, vertigo, dysphagia, epistaxis, recent dental work                                       CV:  Denies Chest pain, palpitations, IRIZARRY, orthopnea                                                                                          RESPIRATORY: +SOB Denies Wheezing, SOB, cough / sputum, hemoptysis                                                                GI:  Denies Nausea, vomiting, diarrhea, constipation, melena, difficulty swallowing                                               : Denies Hematuria, dysuria, urgency, incontinence                                                                                         MUSCULOSKELETAL:  +edema Denies arthritis, joint swelling, muscle weakness                                                             SKIN/BREAST:  Denies rash, itching, hair loss, masses                                                                                            PSYCH:  Denies depression, anxiety, suicidal ideation                                                                                               HEME/LYMPH:  Denies bruises easily, enlarged lymph nodes, tender lymph nodes                                        ENDOCRINE:  Denies cold intolerance, heat intolerance, polydipsia

## 2021-09-07 NOTE — H&P ADULT - ATTENDING COMMENTS
75 y/o M HTN, HLD, IDDM, CKD (Stage IV, baseline Cr 2.5-3), carotid artery stenosis s/p carotid stents, PAD, anemia, hypothyroid, CAD, chronic dCHF, and severe AS who presents with acute on chronic heart failure and VERONICA.  -kidney funciton stable  -plan for BAV given tenuous renal status and VERONICA  -VERONICA management as per Renal  -BP controlled, sugars controlled  -stable heart failure status  -long discussion with family and pt about BAV vs TAVR and given renal status will do BAV. Per pt's renal team (Dr. Gaffney) he feels that the forward flow will improve kidney function and make TAVR safer.

## 2021-09-07 NOTE — PATIENT PROFILE ADULT - VISION (WITH CORRECTIVE LENSES IF THE PATIENT USUALLY WEARS THEM):
Per pt can not see well with left eye/Partially impaired: cannot see medication labels or newsprint, but can see obstacles in path, and the surrounding layout; can count fingers at arm's length

## 2021-09-07 NOTE — H&P ADULT - ASSESSMENT
A/P: 77 yo Green[place]ic/English speaking M with PMHx HTN, HLD, IDDM, CKD (Stage IV, baseline Cr 2.5-3), carotid artery stenosis s/p carotid stents, PAD, anemia, hypothyroid, CAD, chronic dCHF, and severe AS who is transferred from [facility] for further evaluation of his valvular disease.  Patient had a recent hospitalization in July for an acute on chronic heart failure exacerbation.  At that time he underwent cardiac cath and echo cardiogram and he was diagnosed with severe AS.  He had cardiac cath and structural CT scans for TAVR work up after which he developed VERONICA and his Cr bumped to 8 but never required RRT.  He eventually was discharged home and returns to hospital a couple of days ago with fluid overload.  He was admitted for acute on chronic dCHF exacerbation, diuresed with IV lasix and symptoms and swelling improved.  He is now transferred for possible TAVR.     Neurovascular: hx of dementia  -con't donepezil 10 mg QHS    Cardiovascular: Severe AS being evaluated for TAVR  -HTN con't Norvasc 10 mg QD and metoprolol 50 mg Q6H with hold parameters  -con't ASA 81 mg for CAD   -con't lipitor for HLD   -Follow up with Dr. Cole in am regarding plans for TAVR  -had structural scans in July, any need to repeat here?  -lasix 40 mg PO QD for chronic dCHF   -on hydralazine at home, held at [facility], will con't to hold here    Respiratory: saturating well on room air, lungs clear  -If on oxygen wean to RA from for O2 Sat > 93%.  -CXR in am    GI:  -NPO after MN just in case TAVR scans need to be done  -PPX.    Renal / : hx of CKD  -CKD appears to be at baseline  -high risk to progress to ESRD after TAVR   -Renal consult in am  -Monitor I/O's.    Endocrine: IDDM   -[facility] decreased lantus to 8 u QHS with sliding scale, will continue that here and montior finger sticks    Hematologic:  -HSQ for DVT ppx    Disposition:  -9 lachman, telemetry, for TAVR eval

## 2021-09-07 NOTE — H&P ADULT - NSHPSOCIALHISTORY_GEN_ALL_CORE
Social History  Smoker:   NO       Pack Years:       When Quit:  ETOH Use:   NO   Frequency / Quantity:  Ilicit Drug Use:   NO  Occupation: does not work   Assistant Devices:   None   Lives with: family

## 2021-09-07 NOTE — PATIENT PROFILE ADULT - HAS THE PATIENT RECEIVED THE INFLUENZA VACCINE THIS SEASON?
Joyce returns today for 2 month follow up for migraines.  She is continuing to experience frequent migraines, several times per week.  She reports only marginal improvement in her migraines with topiramate, as they are now somewhat more manageable.  She also reports having lost some weight, and she is pleased with this.  She continues to have good relief with use of sumatriptan.    Patient Active Problem List    Diagnosis Date Noted   • Mixed hyperlipidemia 08/12/2020     Priority: Low            • Encounter for IUD insertion 01/15/2020     Priority: Low   • High-risk pregnancy 05/13/2019     Priority: Low     Added automatically from request for surgery [id number]     • BP check 03/28/2019     Priority: Low   • High risk pregnancy, antepartum 02/22/2019     Priority: Low   • Chronic hypertension during pregnancy, antepartum 02/22/2019     Priority: Low   • Essential hypertension 05/10/2017     Priority: Low   • Family history of early CAD 05/10/2017     Priority: Low   • Tobacco use 05/10/2017     Priority: Low   • History of drug use 05/10/2017     Priority: Low   • Bipolar affective disorder (CMS/[facility])      Priority: Low   • ADD (attention deficit disorder)      Priority: Low   • Acne      Priority: Low     Current Outpatient Medications   Medication Sig   • fluticasone (FLONASE) 50 MCG/ACT nasal spray USE 2 SPRAYS IN EACH NOSTRIL ONCE DAILY. SHAKE LIQUID   • lisinopril (ZESTRIL) 10 MG tablet Take 0.5 tablets by mouth daily.   • olopatadine (PATANOL) 0.1 % ophthalmic solution Place 1 drop into both eyes 2 times daily as needed for Allergies. Indications: Allergic Conjunctivitis   • Retin-A 0.05 % cream APPLY EXTERNALLY TO FACE AT BEDTIME   • clobetasol (TEMOVATE) 0.05 % topical solution APPLY EXTERNALLY TO THE SCALP TWICE DAILY   • albuterol 108 (90 Base) MCG/ACT inhaler Inhale 2 puffs into the lungs every 4 hours as needed for Shortness of Breath or Wheezing.   • montelukast (SINGULAIR) 10 MG tablet Take 1 tablet by  mouth nightly.   • triamcinolone (ARISTOCORT) 0.1 % cream Apply twice daily to rash on arm as needed.   • topiramate (TOPAMAX) 50 MG tablet Take 0.5 tablets by mouth nightly for 7 days, THEN 0.5 tablets 2 times daily for 7 days, THEN 1 tablet 2 times daily. (Patient taking differently: one tablet 2 times daily)   • pantoprazole (PROTONIX) 20 MG tablet TAKE 2 TABLETS BY MOUTH DAILY   • labetalol (NORMODYNE) 200 MG tablet Take 1 tablet by mouth 2 times daily.   • sumatriptan (IMITREX) 100 MG tablet Take 1 tablet by mouth as needed for Migraine. May repeat after 2 hours if needed. Maximum 2 tablets in 24 hours.   • ondansetron (ZOFRAN ODT) 4 MG disintegrating tablet Place 1 tablet onto the tongue every 8 hours as needed for Nausea.   • azelaic acid (FINACEA) 15 % gel APPLY EXTERNALLY TO THE AFFECTED AREA TWICE DAILY   • Multiple Vitamins-Minerals (WOMENS MULTI PO) Take by mouth daily.   • clindamycin (CLEOCIN T) 1 % topical solution Apply topically 2 times daily.   • Lactobacillus-Inulin ([facility] DIGESTIVE [facility]) capsule Take 1 capsule by mouth daily.   • acetaminophen (TYLENOL) 325 MG tablet Take 650 mg by mouth every 4 hours as needed for Pain.     No current facility-administered medications for this visit.       PHYSICAL EXAMINATION:  GENERAL: The patient is a well-developed, well-nourished female in no acute distress.  Visit Vitals  /89   Pulse 85   Wt 61 kg   LMP 05/04/2021 (Exact Date)   BMI 22.38 kg/m²     NEUROLOGIC EXAM:  Alert and attentive.  Speech is fluent and without dysarthria.  Mood and affect are normal.  Extraocular movements are full without nystagmus.  Facial strength is full and symmetric.  No limb weakness is seen.  There are no abnormal movements.  Stance and gait are normal.    IMPRESSION:  Ms. Jaramillo continues to have frequent and severe migraines, often associated with dizziness.  She has now failed trials of topiramate and labetalol for prophylaxis, and tricyclics are relatively  contraindicated in the setting of her bipolar disorder.  She may therefore benefit from a trial of a CGRP antagonist.  Risks and benefits were reviewed.    RECOMMENDATIONS:  1. Begin Emgality with 240 mg loading dose then 120 mg subcutaneously monthly.  2. Continue sumatriptan 100 mg as needed for severe migraine, with ondansetron ODT 4 mg as needed for associated nausea.  3. Continue current topiramate 50 mg twice daily for now.  We may try weaning this off pending response to Emgality.  4. She is encouraged to keep a log of her headaches.  5. Follow-up in 2 months for reassessment.     no...

## 2021-09-08 LAB
A1C WITH ESTIMATED AVERAGE GLUCOSE RESULT: 6.1 % — HIGH (ref 4–5.6)
ALBUMIN SERPL ELPH-MCNC: 3.8 G/DL — SIGNIFICANT CHANGE UP (ref 3.3–5)
ALP SERPL-CCNC: 153 U/L — HIGH (ref 40–120)
ALT FLD-CCNC: 19 U/L — SIGNIFICANT CHANGE UP (ref 10–45)
ANION GAP SERPL CALC-SCNC: 11 MMOL/L — SIGNIFICANT CHANGE UP (ref 5–17)
APPEARANCE UR: CLEAR — SIGNIFICANT CHANGE UP
APTT BLD: 32.8 SEC — SIGNIFICANT CHANGE UP (ref 27.5–35.5)
AST SERPL-CCNC: 32 U/L — SIGNIFICANT CHANGE UP (ref 10–40)
BACTERIA # UR AUTO: PRESENT /HPF
BILIRUB DIRECT SERPL-MCNC: <0.2 MG/DL — SIGNIFICANT CHANGE UP (ref 0–0.2)
BILIRUB INDIRECT FLD-MCNC: SIGNIFICANT CHANGE UP MG/DL (ref 0.2–1)
BILIRUB SERPL-MCNC: 0.5 MG/DL — SIGNIFICANT CHANGE UP (ref 0.2–1.2)
BILIRUB UR-MCNC: NEGATIVE — SIGNIFICANT CHANGE UP
BLD GP AB SCN SERPL QL: NEGATIVE — SIGNIFICANT CHANGE UP
BLD GP AB SCN SERPL QL: NEGATIVE — SIGNIFICANT CHANGE UP
BUN SERPL-MCNC: 49 MG/DL — HIGH (ref 7–23)
CALCIUM SERPL-MCNC: 9.2 MG/DL — SIGNIFICANT CHANGE UP (ref 8.4–10.5)
CHLORIDE SERPL-SCNC: 104 MMOL/L — SIGNIFICANT CHANGE UP (ref 96–108)
CHOLEST SERPL-MCNC: 135 MG/DL — SIGNIFICANT CHANGE UP
CK MB CFR SERPL CALC: 2.3 NG/ML — SIGNIFICANT CHANGE UP (ref 0–6.7)
CK SERPL-CCNC: 152 U/L — SIGNIFICANT CHANGE UP (ref 30–200)
CO2 SERPL-SCNC: 28 MMOL/L — SIGNIFICANT CHANGE UP (ref 22–31)
COLOR SPEC: YELLOW — SIGNIFICANT CHANGE UP
COVID-19 SPIKE DOMAIN AB INTERP: POSITIVE
COVID-19 SPIKE DOMAIN ANTIBODY RESULT: >250 U/ML — HIGH
CREAT SERPL-MCNC: 3.21 MG/DL — HIGH (ref 0.5–1.3)
DIFF PNL FLD: NEGATIVE — SIGNIFICANT CHANGE UP
EPI CELLS # UR: SIGNIFICANT CHANGE UP /HPF (ref 0–5)
ESTIMATED AVERAGE GLUCOSE: 128 MG/DL — HIGH (ref 68–114)
GLUCOSE BLDC GLUCOMTR-MCNC: 114 MG/DL — HIGH (ref 70–99)
GLUCOSE BLDC GLUCOMTR-MCNC: 158 MG/DL — HIGH (ref 70–99)
GLUCOSE BLDC GLUCOMTR-MCNC: 215 MG/DL — HIGH (ref 70–99)
GLUCOSE BLDC GLUCOMTR-MCNC: 52 MG/DL — CRITICAL LOW (ref 70–99)
GLUCOSE BLDC GLUCOMTR-MCNC: 90 MG/DL — SIGNIFICANT CHANGE UP (ref 70–99)
GLUCOSE SERPL-MCNC: 172 MG/DL — HIGH (ref 70–99)
GLUCOSE UR QL: NEGATIVE — SIGNIFICANT CHANGE UP
HCT VFR BLD CALC: 33.7 % — LOW (ref 39–50)
HCV AB S/CO SERPL IA: 0.04 S/CO — SIGNIFICANT CHANGE UP
HCV AB SERPL-IMP: SIGNIFICANT CHANGE UP
HDLC SERPL-MCNC: 50 MG/DL — SIGNIFICANT CHANGE UP
HGB BLD-MCNC: 10.9 G/DL — LOW (ref 13–17)
INR BLD: 1.08 — SIGNIFICANT CHANGE UP (ref 0.88–1.16)
KETONES UR-MCNC: NEGATIVE — SIGNIFICANT CHANGE UP
LEUKOCYTE ESTERASE UR-ACNC: NEGATIVE — SIGNIFICANT CHANGE UP
LIPID PNL WITH DIRECT LDL SERPL: 71 MG/DL — SIGNIFICANT CHANGE UP
MAGNESIUM SERPL-MCNC: 1.9 MG/DL — SIGNIFICANT CHANGE UP (ref 1.6–2.6)
MCHC RBC-ENTMCNC: 31 PG — SIGNIFICANT CHANGE UP (ref 27–34)
MCHC RBC-ENTMCNC: 32.3 GM/DL — SIGNIFICANT CHANGE UP (ref 32–36)
MCV RBC AUTO: 95.7 FL — SIGNIFICANT CHANGE UP (ref 80–100)
NITRITE UR-MCNC: NEGATIVE — SIGNIFICANT CHANGE UP
NON HDL CHOLESTEROL: 85 MG/DL — SIGNIFICANT CHANGE UP
NRBC # BLD: 0 /100 WBCS — SIGNIFICANT CHANGE UP (ref 0–0)
NT-PROBNP SERPL-SCNC: 1599 PG/ML — HIGH (ref 0–300)
PH UR: 6 — SIGNIFICANT CHANGE UP (ref 5–8)
PLATELET # BLD AUTO: 232 K/UL — SIGNIFICANT CHANGE UP (ref 150–400)
POTASSIUM SERPL-MCNC: 4.1 MMOL/L — SIGNIFICANT CHANGE UP (ref 3.5–5.3)
POTASSIUM SERPL-SCNC: 4.1 MMOL/L — SIGNIFICANT CHANGE UP (ref 3.5–5.3)
PROT SERPL-MCNC: 6.7 G/DL — SIGNIFICANT CHANGE UP (ref 6–8.3)
PROT UR-MCNC: 100 MG/DL
PROTHROM AB SERPL-ACNC: 12.9 SEC — SIGNIFICANT CHANGE UP (ref 10.6–13.6)
RBC # BLD: 3.52 M/UL — LOW (ref 4.2–5.8)
RBC # FLD: 13.3 % — SIGNIFICANT CHANGE UP (ref 10.3–14.5)
RH IG SCN BLD-IMP: POSITIVE — SIGNIFICANT CHANGE UP
RH IG SCN BLD-IMP: POSITIVE — SIGNIFICANT CHANGE UP
SARS-COV-2 IGG+IGM SERPL QL IA: >250 U/ML — HIGH
SARS-COV-2 IGG+IGM SERPL QL IA: POSITIVE
SARS-COV-2 RNA SPEC QL NAA+PROBE: SIGNIFICANT CHANGE UP
SODIUM SERPL-SCNC: 143 MMOL/L — SIGNIFICANT CHANGE UP (ref 135–145)
SP GR SPEC: 1.02 — SIGNIFICANT CHANGE UP (ref 1–1.03)
T4 AB SER-ACNC: 8.92 UG/DL — SIGNIFICANT CHANGE UP (ref 4.5–11.7)
TRIGL SERPL-MCNC: 69 MG/DL — SIGNIFICANT CHANGE UP
TROPONIN T SERPL-MCNC: 0.06 NG/ML — CRITICAL HIGH (ref 0–0.01)
TSH SERPL-MCNC: 9.02 UIU/ML — HIGH (ref 0.27–4.2)
UROBILINOGEN FLD QL: 0.2 E.U./DL — SIGNIFICANT CHANGE UP
WBC # BLD: 4.67 K/UL — SIGNIFICANT CHANGE UP (ref 3.8–10.5)
WBC # FLD AUTO: 4.67 K/UL — SIGNIFICANT CHANGE UP (ref 3.8–10.5)
WBC UR QL: ABNORMAL /HPF

## 2021-09-08 PROCEDURE — 99223 1ST HOSP IP/OBS HIGH 75: CPT

## 2021-09-08 PROCEDURE — 93880 EXTRACRANIAL BILAT STUDY: CPT | Mod: 26

## 2021-09-08 PROCEDURE — 93306 TTE W/DOPPLER COMPLETE: CPT | Mod: 26

## 2021-09-08 PROCEDURE — 71045 X-RAY EXAM CHEST 1 VIEW: CPT | Mod: 26

## 2021-09-08 PROCEDURE — 99222 1ST HOSP IP/OBS MODERATE 55: CPT | Mod: GC

## 2021-09-08 RX ORDER — DEXTROSE 50 % IN WATER 50 %
25 SYRINGE (ML) INTRAVENOUS ONCE
Refills: 0 | Status: COMPLETED | OUTPATIENT
Start: 2021-09-08 | End: 2021-09-08

## 2021-09-08 RX ORDER — CHLORHEXIDINE GLUCONATE 213 G/1000ML
5 SOLUTION TOPICAL ONCE
Refills: 0 | Status: DISCONTINUED | OUTPATIENT
Start: 2021-09-08 | End: 2021-09-10

## 2021-09-08 RX ORDER — SODIUM CHLORIDE 9 MG/ML
1000 INJECTION INTRAMUSCULAR; INTRAVENOUS; SUBCUTANEOUS
Refills: 0 | Status: DISCONTINUED | OUTPATIENT
Start: 2021-09-08 | End: 2021-09-09

## 2021-09-08 RX ORDER — CHLORHEXIDINE GLUCONATE 213 G/1000ML
1 SOLUTION TOPICAL ONCE
Refills: 0 | Status: COMPLETED | OUTPATIENT
Start: 2021-09-08 | End: 2021-09-08

## 2021-09-08 RX ORDER — CHLORHEXIDINE GLUCONATE 213 G/1000ML
1 SOLUTION TOPICAL ONCE
Refills: 0 | Status: COMPLETED | OUTPATIENT
Start: 2021-09-09 | End: 2021-09-09

## 2021-09-08 RX ADMIN — CHLORHEXIDINE GLUCONATE 1 APPLICATION(S): 213 SOLUTION TOPICAL at 20:56

## 2021-09-08 RX ADMIN — PANTOPRAZOLE SODIUM 40 MILLIGRAM(S): 20 TABLET, DELAYED RELEASE ORAL at 06:54

## 2021-09-08 RX ADMIN — Medication 25 MICROGRAM(S): at 06:54

## 2021-09-08 RX ADMIN — HEPARIN SODIUM 5000 UNIT(S): 5000 INJECTION INTRAVENOUS; SUBCUTANEOUS at 17:33

## 2021-09-08 RX ADMIN — Medication 40 MILLIGRAM(S): at 06:54

## 2021-09-08 RX ADMIN — Medication 81 MILLIGRAM(S): at 11:26

## 2021-09-08 RX ADMIN — SODIUM CHLORIDE 50 MILLILITER(S): 9 INJECTION INTRAMUSCULAR; INTRAVENOUS; SUBCUTANEOUS at 22:10

## 2021-09-08 RX ADMIN — Medication 50 MILLIGRAM(S): at 17:33

## 2021-09-08 RX ADMIN — Medication 50 MILLIGRAM(S): at 01:49

## 2021-09-08 RX ADMIN — AMLODIPINE BESYLATE 10 MILLIGRAM(S): 2.5 TABLET ORAL at 06:53

## 2021-09-08 RX ADMIN — Medication 50 MILLIGRAM(S): at 06:54

## 2021-09-08 RX ADMIN — Medication 25 GRAM(S): at 07:46

## 2021-09-08 RX ADMIN — Medication 4: at 17:31

## 2021-09-08 RX ADMIN — ATORVASTATIN CALCIUM 40 MILLIGRAM(S): 80 TABLET, FILM COATED ORAL at 22:09

## 2021-09-08 RX ADMIN — SODIUM CHLORIDE 3 MILLILITER(S): 9 INJECTION INTRAMUSCULAR; INTRAVENOUS; SUBCUTANEOUS at 11:24

## 2021-09-08 RX ADMIN — DONEPEZIL HYDROCHLORIDE 10 MILLIGRAM(S): 10 TABLET, FILM COATED ORAL at 22:09

## 2021-09-08 RX ADMIN — SODIUM CHLORIDE 3 MILLILITER(S): 9 INJECTION INTRAMUSCULAR; INTRAVENOUS; SUBCUTANEOUS at 21:58

## 2021-09-08 RX ADMIN — SODIUM CHLORIDE 3 MILLILITER(S): 9 INJECTION INTRAMUSCULAR; INTRAVENOUS; SUBCUTANEOUS at 06:44

## 2021-09-08 RX ADMIN — HEPARIN SODIUM 5000 UNIT(S): 5000 INJECTION INTRAVENOUS; SUBCUTANEOUS at 06:54

## 2021-09-08 RX ADMIN — CHLORHEXIDINE GLUCONATE 1 APPLICATION(S): 213 SOLUTION TOPICAL at 22:11

## 2021-09-08 NOTE — CONSULT NOTE ADULT - SUBJECTIVE AND OBJECTIVE BOX
Request for consultation:  Requested by:    Patient is a 76y old  Male who presents with a chief complaint of   HPI:  75 yo Vietnamese/English speaking M with PMHx HTN, HLD, IDDM, CKD (Stage IV, baseline Cr 2.5-3), carotid artery stenosis s/p carotid stents, PAD, anemia, hypothyroid, CAD, chronic dCHF, and severe AS who is transferred from [facility] for further evaluation of his valvular disease.  Patient had a recent hospitalization in July for an acute on chronic heart failure exacerbation.  At that time he underwent cardiac cath and echo cardiogram and he was diagnosed with severe AS.  He had cardiac cath and structural CT scans for TAVR work up after which he developed VERONICA and his Cr bumped to 8 but never required RRT.  He eventually was discharged home and returns to hospital a couple of days ago with fluid overload.  He was admitted for acute on chronic dCHF exacerbation, diuresed with IV lasix and symptoms and swelling improved.  He is now transferred for possible TAVR.  He is seen at the bedside with no complaints.  he denies HA, dizziness, CP, current SOB, orthopnea, PND, N/V/D, or LE swelling.     (07 Sep 2021 22:45)    Consultant HPI: AM BG usually 100-120 and -200. Pt takes his 15U tresiba every night with minimal PM snacks. The patient also takes his synthroid appropriately every morning.       FAMILY HISTORY:  FH: diabetes mellitus      PAST MEDICAL & SURGICAL HISTORY:  Hypertension    Hyperlipidemia    DM (diabetes mellitus)    H/O carotid artery stenosis    VISHNU (iron deficiency anemia)    H/O carotid artery stenosis  s/p carotid stent x 2 in 2019      Birth History:  Developmental History:    Allergies    No Known Allergies    Intolerances      MEDICATIONS  (STANDING):  amLODIPine   Tablet 10 milliGRAM(s) Oral daily  aspirin enteric coated 81 milliGRAM(s) Oral daily  atorvastatin 40 milliGRAM(s) Oral at bedtime  chlorhexidine 0.12% Liquid 5 milliLiter(s) Swish and Spit once  chlorhexidine 4% Liquid 1 Application(s) Topical once  chlorhexidine 4% Liquid 1 Application(s) Topical once  dextrose 40% Gel 15 Gram(s) Oral once  dextrose 5%. 1000 milliLiter(s) (50 mL/Hr) IV Continuous <Continuous>  dextrose 5%. 1000 milliLiter(s) (100 mL/Hr) IV Continuous <Continuous>  dextrose 50% Injectable 25 Gram(s) IV Push once  dextrose 50% Injectable 12.5 Gram(s) IV Push once  dextrose 50% Injectable 25 Gram(s) IV Push once  donepezil 10 milliGRAM(s) Oral at bedtime  furosemide    Tablet 40 milliGRAM(s) Oral daily  glucagon  Injectable 1 milliGRAM(s) IntraMuscular once  heparin   Injectable 5000 Unit(s) SubCutaneous every 8 hours  insulin lispro (ADMELOG) corrective regimen sliding scale   SubCutaneous Before meals and at bedtime  levothyroxine 25 MICROGram(s) Oral daily  metoprolol tartrate 50 milliGRAM(s) Oral every 6 hours  pantoprazole    Tablet 40 milliGRAM(s) Oral before breakfast  sodium chloride 0.9% lock flush 3 milliLiter(s) IV Push every 8 hours  sodium chloride 0.9%. 1000 milliLiter(s) (50 mL/Hr) IV Continuous <Continuous>    MEDICATIONS  (PRN):      Vital Signs Last 24 Hrs  T(C): 37.4 (08 Sep 2021 04:40), Max: 37.4 (08 Sep 2021 04:40)  T(F): 99.4 (08 Sep 2021 04:40), Max: 99.4 (08 Sep 2021 04:40)  HR: 65 (08 Sep 2021 16:25) (52 - 69)  BP: 144/63 (08 Sep 2021 16:25) (125/55 - 194/76)  BP(mean): 91 (08 Sep 2021 16:25) (79 - 109)  RR: 15 (08 Sep 2021 16:25) (15 - 18)  SpO2: 96% (08 Sep 2021 16:25) (91% - 96%)  Height (cm): 165.1 (09-07 @ 22:14)  Weight (kg): 71.4 (09-07 @ 22:14)  BMI (kg/m2): 26.2 (09-07 @ 22:14)      LABS                          10.9   4.67  )-----------( 232      ( 08 Sep 2021 08:30 )             33.7     09-08    143  |  104  |  49<H>  ----------------------------<  172<H>  4.1   |  28  |  3.21<H>    Ca    9.2      08 Sep 2021 08:30  Mg     1.9     09-08    TPro  6.7  /  Alb  3.8  /  TBili  0.5  /  DBili  <0.2  /  AST  32  /  ALT  19  /  AlkPhos  153<H>  09-08        CAPILLARY BLOOD GLUCOSE      POCT Blood Glucose.: 90 mg/dL (08 Sep 2021 11:43)  POCT Blood Glucose.: 158 mg/dL (08 Sep 2021 08:09)  POCT Blood Glucose.: 52 mg/dL (08 Sep 2021 07:13)

## 2021-09-08 NOTE — CONSULT NOTE ADULT - SUBJECTIVE AND OBJECTIVE BOX
Patient is a 76y old  Male who presents with a chief complaint of SOB    HPI:  75 yo Armenian/English speaking M with PMHx HTN, CKD (Stage IV, baseline Cr 2.5-3) and severe AS who is transferred from [facility] for further evaluation of his valvular disease. Per patient has known history of chronic kidney disease and follows with a nephrologist in [place] but does not remember the name. States he has never required hemodialysis. Per chart review, pt was hospitalized previously and underwent cardiac cath and developed an VERONICA with a Cr bump to 8. His kidney function slowly recovered and he was DC'd home. He came back to the hospital because he was experiencing SOB and will undergo catherization.       PAST MEDICAL & SURGICAL HISTORY:  Hypertension    Hyperlipidemia    DM (diabetes mellitus)    H/O carotid artery stenosis    VISHNU (iron deficiency anemia)    H/O carotid artery stenosis  s/p carotid stent x 2 in 2019          Allergies:  No Known Allergies      Home Medications:   amLODIPine   Tablet 10 milliGRAM(s) Oral daily  aspirin enteric coated 81 milliGRAM(s) Oral daily  atorvastatin 40 milliGRAM(s) Oral at bedtime  chlorhexidine 0.12% Liquid 5 milliLiter(s) Swish and Spit once  chlorhexidine 4% Liquid 1 Application(s) Topical once  chlorhexidine 4% Liquid 1 Application(s) Topical once  dextrose 40% Gel 15 Gram(s) Oral once  dextrose 5%. 1000 milliLiter(s) IV Continuous <Continuous>  dextrose 5%. 1000 milliLiter(s) IV Continuous <Continuous>  dextrose 50% Injectable 25 Gram(s) IV Push once  dextrose 50% Injectable 12.5 Gram(s) IV Push once  dextrose 50% Injectable 25 Gram(s) IV Push once  donepezil 10 milliGRAM(s) Oral at bedtime  furosemide    Tablet 40 milliGRAM(s) Oral daily  glucagon  Injectable 1 milliGRAM(s) IntraMuscular once  heparin   Injectable 5000 Unit(s) SubCutaneous every 8 hours  insulin lispro (ADMELOG) corrective regimen sliding scale   SubCutaneous Before meals and at bedtime  levothyroxine 25 MICROGram(s) Oral daily  metoprolol tartrate 50 milliGRAM(s) Oral every 6 hours  pantoprazole    Tablet 40 milliGRAM(s) Oral before breakfast  sodium chloride 0.9% lock flush 3 milliLiter(s) IV Push every 8 hours      Hospital Medications:   MEDICATIONS  (STANDING):  amLODIPine   Tablet 10 milliGRAM(s) Oral daily  aspirin enteric coated 81 milliGRAM(s) Oral daily  atorvastatin 40 milliGRAM(s) Oral at bedtime  chlorhexidine 0.12% Liquid 5 milliLiter(s) Swish and Spit once  chlorhexidine 4% Liquid 1 Application(s) Topical once  chlorhexidine 4% Liquid 1 Application(s) Topical once  dextrose 40% Gel 15 Gram(s) Oral once  dextrose 5%. 1000 milliLiter(s) (50 mL/Hr) IV Continuous <Continuous>  dextrose 5%. 1000 milliLiter(s) (100 mL/Hr) IV Continuous <Continuous>  dextrose 50% Injectable 25 Gram(s) IV Push once  dextrose 50% Injectable 12.5 Gram(s) IV Push once  dextrose 50% Injectable 25 Gram(s) IV Push once  donepezil 10 milliGRAM(s) Oral at bedtime  furosemide    Tablet 40 milliGRAM(s) Oral daily  glucagon  Injectable 1 milliGRAM(s) IntraMuscular once  heparin   Injectable 5000 Unit(s) SubCutaneous every 8 hours  insulin lispro (ADMELOG) corrective regimen sliding scale   SubCutaneous Before meals and at bedtime  levothyroxine 25 MICROGram(s) Oral daily  metoprolol tartrate 50 milliGRAM(s) Oral every 6 hours  pantoprazole    Tablet 40 milliGRAM(s) Oral before breakfast  sodium chloride 0.9% lock flush 3 milliLiter(s) IV Push every 8 hours      SOCIAL HISTORY:  Denies ETOh, Smoking,     Family History:  FAMILY HISTORY:  FH: diabetes mellitus          VITALS:  T(F): 99.4 (09-08-21 @ 04:40), Max: 99.4 (09-08-21 @ 04:40)  HR: 60 (09-08-21 @ 13:17)  BP: 143/65 (09-08-21 @ 13:17)  RR: 17 (09-08-21 @ 11:55)  SpO2: 93% (09-08-21 @ 13:17)  Wt(kg): --    Height (cm): 165.1 (09-07 @ 22:14)  Weight (kg): 71.4 (09-07 @ 22:14)  BMI (kg/m2): 26.2 (09-07 @ 22:14)  BSA (m2): 1.79 (09-07 @ 22:14)  CAPILLARY BLOOD GLUCOSE      POCT Blood Glucose.: 90 mg/dL (08 Sep 2021 11:43)  POCT Blood Glucose.: 158 mg/dL (08 Sep 2021 08:09)  POCT Blood Glucose.: 52 mg/dL (08 Sep 2021 07:13)      Review of Systems:  10 point ROS negative except as per HPI    PHYSICAL EXAM:  GENERAL: Alert, awake, oriented x3   HEENT: ENZO, EOMI  CHEST/LUNG: Bilateral clear breath sounds  HEART: Regular rate and rhythm, no murmur, no gallops, no rub   ABDOMEN: Soft, nontender, non distended  EXTREMITIES: no pedal edema, WWP  Neurology: AAOx3, no focal neurological deficit  SKIN: No rash or skin lesion     LABS:  09-08    143  |  104  |  49<H>  ----------------------------<  172<H>  4.1   |  28  |  3.21<H>    Ca    9.2      08 Sep 2021 08:30  Mg     1.9     09-08    TPro  6.7  /  Alb  3.8  /  TBili  0.5  /  DBili  <0.2  /  AST  32  /  ALT  19  /  AlkPhos  153<H>  09-08    Creatinine Trend: 3.21 <--                        10.9   4.67  )-----------( 232      ( 08 Sep 2021 08:30 )             33.7     Urine Studies:         Patient is a 76y old  Male who presents with a chief complaint of SOB    HPI:  75 yo [place]guill[place]/English speaking M with PMHx HTN, CKD (Stage IV, baseline Cr 2.5-3) and severe AS who is transferred from [facility] for further evaluation of his valvular disease. Per patient has known history of chronic kidney disease and follows with a nephrologist in [place] but does not remember the name. States he has never required hemodialysis. Per chart review, pt was hospitalized previously and underwent cardiac cath and developed an VERONICA with a Cr bump to 8. His kidney function slowly recovered and he was DC'd home. He came back to the hospital because he was experiencing SOB and will undergo catherization.       PAST MEDICAL & SURGICAL HISTORY:  Hypertension    Hyperlipidemia    DM (diabetes mellitus)    H/O carotid artery stenosis    VISHNU (iron deficiency anemia)    H/O carotid artery stenosis  s/p carotid stent x 2 in 2019          Allergies:  No Known Allergies      Home Medications:   amLODIPine   Tablet 10 milliGRAM(s) Oral daily  aspirin enteric coated 81 milliGRAM(s) Oral daily  atorvastatin 40 milliGRAM(s) Oral at bedtime  chlorhexidine 0.12% Liquid 5 milliLiter(s) Swish and Spit once  chlorhexidine 4% Liquid 1 Application(s) Topical once  chlorhexidine 4% Liquid 1 Application(s) Topical once  dextrose 40% Gel 15 Gram(s) Oral once  dextrose 5%. 1000 milliLiter(s) IV Continuous <Continuous>  dextrose 5%. 1000 milliLiter(s) IV Continuous <Continuous>  dextrose 50% Injectable 25 Gram(s) IV Push once  dextrose 50% Injectable 12.5 Gram(s) IV Push once  dextrose 50% Injectable 25 Gram(s) IV Push once  donepezil 10 milliGRAM(s) Oral at bedtime  furosemide    Tablet 40 milliGRAM(s) Oral daily  glucagon  Injectable 1 milliGRAM(s) IntraMuscular once  heparin   Injectable 5000 Unit(s) SubCutaneous every 8 hours  insulin lispro (ADMELOG) corrective regimen sliding scale   SubCutaneous Before meals and at bedtime  levothyroxine 25 MICROGram(s) Oral daily  metoprolol tartrate 50 milliGRAM(s) Oral every 6 hours  pantoprazole    Tablet 40 milliGRAM(s) Oral before breakfast  sodium chloride 0.9% lock flush 3 milliLiter(s) IV Push every 8 hours      Hospital Medications:   MEDICATIONS  (STANDING):  amLODIPine   Tablet 10 milliGRAM(s) Oral daily  aspirin enteric coated 81 milliGRAM(s) Oral daily  atorvastatin 40 milliGRAM(s) Oral at bedtime  chlorhexidine 0.12% Liquid 5 milliLiter(s) Swish and Spit once  chlorhexidine 4% Liquid 1 Application(s) Topical once  chlorhexidine 4% Liquid 1 Application(s) Topical once  dextrose 40% Gel 15 Gram(s) Oral once  dextrose 5%. 1000 milliLiter(s) (50 mL/Hr) IV Continuous <Continuous>  dextrose 5%. 1000 milliLiter(s) (100 mL/Hr) IV Continuous <Continuous>  dextrose 50% Injectable 25 Gram(s) IV Push once  dextrose 50% Injectable 12.5 Gram(s) IV Push once  dextrose 50% Injectable 25 Gram(s) IV Push once  donepezil 10 milliGRAM(s) Oral at bedtime  furosemide    Tablet 40 milliGRAM(s) Oral daily  glucagon  Injectable 1 milliGRAM(s) IntraMuscular once  heparin   Injectable 5000 Unit(s) SubCutaneous every 8 hours  insulin lispro (ADMELOG) corrective regimen sliding scale   SubCutaneous Before meals and at bedtime  levothyroxine 25 MICROGram(s) Oral daily  metoprolol tartrate 50 milliGRAM(s) Oral every 6 hours  pantoprazole    Tablet 40 milliGRAM(s) Oral before breakfast  sodium chloride 0.9% lock flush 3 milliLiter(s) IV Push every 8 hours      SOCIAL HISTORY:  Denies ETOh, Smoking, IVDU    Family History:  FAMILY HISTORY:  FH: diabetes mellitus          VITALS:  T(F): 99.4 (09-08-21 @ 04:40), Max: 99.4 (09-08-21 @ 04:40)  HR: 60 (09-08-21 @ 13:17)  BP: 143/65 (09-08-21 @ 13:17)  RR: 17 (09-08-21 @ 11:55)  SpO2: 93% (09-08-21 @ 13:17)  Wt(kg): --    Height (cm): 165.1 (09-07 @ 22:14)  Weight (kg): 71.4 (09-07 @ 22:14)  BMI (kg/m2): 26.2 (09-07 @ 22:14)  BSA (m2): 1.79 (09-07 @ 22:14)  CAPILLARY BLOOD GLUCOSE      POCT Blood Glucose.: 90 mg/dL (08 Sep 2021 11:43)  POCT Blood Glucose.: 158 mg/dL (08 Sep 2021 08:09)  POCT Blood Glucose.: 52 mg/dL (08 Sep 2021 07:13)      Review of Systems:  10 point ROS negative except as per HPI    PHYSICAL EXAM:  GENERAL: Alert, awake, oriented x3   HEENT: ENZO, EOMI  CHEST/LUNG: Bilateral clear breath sounds  HEART: Regular rate and rhythm, no murmur, no gallops, no rub   ABDOMEN: Soft, nontender, non distended  EXTREMITIES: no pedal edema, WWP  Neurology: AAOx3, no focal neurological deficit  SKIN: No rash or skin lesion     LABS:  09-08    143  |  104  |  49<H>  ----------------------------<  172<H>  4.1   |  28  |  3.21<H>    Ca    9.2      08 Sep 2021 08:30  Mg     1.9     09-08    TPro  6.7  /  Alb  3.8  /  TBili  0.5  /  DBili  <0.2  /  AST  32  /  ALT  19  /  AlkPhos  153<H>  09-08    Creatinine Trend: 3.21 <--                        10.9   4.67  )-----------( 232      ( 08 Sep 2021 08:30 )             33.7     Urine Studies:

## 2021-09-08 NOTE — CONSULT NOTE ADULT - ASSESSMENT
76 year old male with PMHx of HTN, HLD, IDDM, CKD (Stage IV baseline Cr 2.5-3), carotid artery stenosis s/p stenting, PAD, Anemia, Hypothyroid, CAD, Chronic diastolic CHF and known aortic stenosis with recent hospitalization in July for acute on chronic CHF exacerbation presented to [facility] complaining of worsening fluid overload. He was admitted for acute on chronic CHF exacerbation, diuresed with IV lasix with improvement. He underwent cardiac cath in July 2021 confirming severe AS. He is now transferred to [facility] under the care of  Dr. Cole for further workup.  Planned for BAV tomorrow.     #DM2   Recommendations:  - Hold all standing insulin, only moderate insulin sliding scale   - C/w POC glucose testing pre-meals and at night time     #Hypothyroidism  Recommendations:  - Please repeat TFTs in AM (TSH and free T4)  - No dose adjustment at this time      Discussed with Dr. Curtis

## 2021-09-08 NOTE — PROGRESS NOTE ADULT - SUBJECTIVE AND OBJECTIVE BOX
Planned Date of Surgery:  9/9/2021                                                                                                                Surgeon: Dr. Cole     Procedure: BAV     HPI:  77 yo Kyrgyz/English speaking M with PMHx HTN, HLD, IDDM, CKD (Stage IV, baseline Cr 2.5-3), carotid artery stenosis s/p carotid stents, PAD, anemia, hypothyroid, CAD, chronic dCHF, and severe AS who is transferred from [facility] for further evaluation of his valvular disease.  Patient had a recent hospitalization in July for an acute on chronic heart failure exacerbation.  At that time he underwent cardiac cath and echo cardiogram and he was diagnosed with severe AS.  He had cardiac cath and structural CT scans for TAVR work up after which he developed VERONICA and his Cr bumped to 8 but never required RRT.  He eventually was discharged home and returns to hospital a couple of days ago with fluid overload.  He was admitted for acute on chronic dCHF exacerbation, diuresed with IV lasix and symptoms and swelling improved.  He is now transferred for possible TAVR.  He is seen at the bedside with no complaints. He denies HA, dizziness, CP, current SOB, orthopnea, PND, N/V/D, or LE swelling.        PAST MEDICAL & SURGICAL HISTORY:  Hypertension    Hyperlipidemia    DM (diabetes mellitus)    H/O carotid artery stenosis    VISHNU (iron deficiency anemia)    H/O carotid artery stenosis  s/p carotid stent x 2 in 2019        No Known Allergies      MEDICATIONS  (STANDING):  amLODIPine   Tablet 10 milliGRAM(s) Oral daily  aspirin enteric coated 81 milliGRAM(s) Oral daily  atorvastatin 40 milliGRAM(s) Oral at bedtime  donepezil 10 milliGRAM(s) Oral at bedtime  furosemide    Tablet 40 milliGRAM(s) Oral daily  heparin   Injectable 5000 Unit(s) SubCutaneous every 8 hours  insulin glargine Injectable (LANTUS) 8 Unit(s) SubCutaneous at bedtime  insulin lispro (ADMELOG) corrective regimen sliding scale   SubCutaneous Before meals and at bedtime  levothyroxine 25 MICROGram(s) Oral daily  metoprolol tartrate 50 milliGRAM(s) Oral every 6 hours  pantoprazole    Tablet 40 milliGRAM(s) Oral before breakfast  sodium chloride 0.9% lock flush 3 milliLiter(s) IV Push every 8 hours    MEDICATIONS  (PRN):      On Beta Blocker? YES     Labs:                        10.9   4.67  )-----------( 232      ( 08 Sep 2021 08:30 )             33.7     09-08    143  |  104  |  49<H>  ----------------------------<  172<H>  4.1   |  28  |  3.21<H>    Ca    9.2      08 Sep 2021 08:30  Mg     1.9     09-08      PT/INR - ( 08 Sep 2021 08:30 )   PT: 12.9 sec;   INR: 1.08          PTT - ( 08 Sep 2021 08:30 )  PTT:32.8 sec    ABO Interpretation: O (09-08-21 @ 08:39)      Hgb A1C: 6.1     EKG:  < from: 12 Lead ECG (07.07.21 @ 07:12) >    Diagnosis Line Normal sinus rhythm  Nonspecific ST abnormality    < end of copied text >    CXR: 9/8 pending official read, bilateral congestion stable.     Echo: < from: TTE Echo Complete w/o Contrast w/ Doppler (07.07.21 @ 09:11) >  CONCLUSIONS:     1. Normal left ventricular size and systolic function.   2. Normal right ventricular size and systolic function.   3. Dilated left atrium.   4. Moderate aortic stenosis. The peak transvalvular velocity is 3.86 m/s, the mean transvalvular gradient is 28.00 mmHg, and the LVOT/AV velocity ratio is 0.33. The aortic valve area (estimated via the continuity method) is 1.12 cm².   5. Mild-to-moderate tricuspid regurgitation.   6. Pulmonary hypertension present, pulmonary artery systolicpressure is 48 mmHg.   7. No pericardial effusion.   8. No prior echo is available for comparison.    < end of copied text >      PFT's:    Carotid Duplex:    Consult in Chart?  YES / NO  Consent in Chart? YES / NO  Pre-op Orders Placed? YES / NO  Blood Prodeucts Ordered? YES / NO  NPO ordered? YES / NO Planned Date of Surgery:  9/9/2021                                                                                                                Surgeon: Dr. Cole     Procedure: BAV     HPI:  75 yo Upper sorbian/English speaking M with PMHx HTN, HLD, IDDM, CKD (Stage IV, baseline Cr 2.5-3), carotid artery stenosis s/p carotid stents, PAD, anemia, hypothyroid, CAD, chronic dCHF, and severe AS who is transferred from [facility] for further evaluation of his valvular disease.  Patient had a recent hospitalization in July for an acute on chronic heart failure exacerbation.  At that time he underwent cardiac cath and echo cardiogram and he was diagnosed with severe AS.  He had cardiac cath and structural CT scans for TAVR work up after which he developed VERONICA and his Cr bumped to 8 but never required RRT.  He eventually was discharged home and returns to hospital a couple of days ago with fluid overload.  He was admitted for acute on chronic dCHF exacerbation, diuresed with IV lasix and symptoms and swelling improved.  He is now transferred for possible TAVR.  He is seen at the bedside with no complaints. He denies HA, dizziness, CP, current SOB, orthopnea, PND, N/V/D, or LE swelling.        PAST MEDICAL & SURGICAL HISTORY:  Hypertension    Hyperlipidemia    DM (diabetes mellitus)    H/O carotid artery stenosis    VISHNU (iron deficiency anemia)    H/O carotid artery stenosis  s/p carotid stent x 2 in 2019        No Known Allergies      MEDICATIONS  (STANDING):  amLODIPine   Tablet 10 milliGRAM(s) Oral daily  aspirin enteric coated 81 milliGRAM(s) Oral daily  atorvastatin 40 milliGRAM(s) Oral at bedtime  donepezil 10 milliGRAM(s) Oral at bedtime  furosemide    Tablet 40 milliGRAM(s) Oral daily  heparin   Injectable 5000 Unit(s) SubCutaneous every 8 hours  insulin glargine Injectable (LANTUS) 8 Unit(s) SubCutaneous at bedtime  insulin lispro (ADMELOG) corrective regimen sliding scale   SubCutaneous Before meals and at bedtime  levothyroxine 25 MICROGram(s) Oral daily  metoprolol tartrate 50 milliGRAM(s) Oral every 6 hours  pantoprazole    Tablet 40 milliGRAM(s) Oral before breakfast  sodium chloride 0.9% lock flush 3 milliLiter(s) IV Push every 8 hours    MEDICATIONS  (PRN):      On Beta Blocker? YES     Labs:                        10.9   4.67  )-----------( 232      ( 08 Sep 2021 08:30 )             33.7     09-08    143  |  104  |  49<H>  ----------------------------<  172<H>  4.1   |  28  |  3.21<H>    Ca    9.2      08 Sep 2021 08:30  Mg     1.9     09-08      PT/INR - ( 08 Sep 2021 08:30 )   PT: 12.9 sec;   INR: 1.08          PTT - ( 08 Sep 2021 08:30 )  PTT:32.8 sec    ABO Interpretation: O (09-08-21 @ 08:39)      Hgb A1C: 6.1     EKG:  < from: 12 Lead ECG (07.07.21 @ 07:12) >    Diagnosis Line Normal sinus rhythm  Nonspecific ST abnormality    < end of copied text >    CXR: 9/8 pending official read, bilateral congestion stable.     Echo: < from: TTE Echo Complete w/o Contrast w/ Doppler (07.07.21 @ 09:11) >  CONCLUSIONS:     1. Normal left ventricular size and systolic function.   2. Normal right ventricular size and systolic function.   3. Dilated left atrium.   4. Moderate aortic stenosis. The peak transvalvular velocity is 3.86 m/s, the mean transvalvular gradient is 28.00 mmHg, and the LVOT/AV velocity ratio is 0.33. The aortic valve area (estimated via the continuity method) is 1.12 cm².   5. Mild-to-moderate tricuspid regurgitation.   6. Pulmonary hypertension present, pulmonary artery systolicpressure is 48 mmHg.   7. No pericardial effusion.   8. No prior echo is available for comparison.    < end of copied text >      PFT's: pending     Carotid Duplex: Pending     Consult in Chart?  YES  Consent in Chart? YES   Pre-op Orders Placed? YES  Blood Products Ordered? YES   NPO ordered? YES

## 2021-09-08 NOTE — CONSULT NOTE ADULT - ASSESSMENT
75 yo Zambian/English speaking M with PMHx HTN, CKD (Stage IV, baseline Cr 2.5-3) and severe AS who is transferred from [facility] for further evaluation of his valvular disease. Nephrology consulted for CKD management.    Assessment/Plan:   #VERONICA on CKD,   Baseline creatinine of (2.5-3) Patient presenting to have valvular disease assessed, as per cardiology to go for procedure tomorrow which will require contrast load. His creatinine at the moment is 3.21, slightly elevated from his normal baseline. Patient will benefit from Poseidon protocol (POSEIDON Trial: Novel Sliding Scale Hydration Effective for the Prevention of CI-VERONICA) as follows:    "For at least an hour before receiving cardiac catheterization, to give 0.9 percent saline at 3 ml/kg. The LVEDP was measured in all patients at the start of the procedure and prior to contrast administration. In the LVEDP group, the fluid rate was adjusted according to the LVEDP (5 ml/kg/hr for LVEDP <13 mmHg; 3 ml/kg/hr for 13-18 mmHg; 1.5 ml/kg/hr for >18 mmHg). The fluid rate was set at the start of the procedure (prior to contrast exposure) and continued up until four hours after the procedure."    Reference: https://www.acc.org/latest-in-cardiology/articles/2012/10/23/10/02/milagrosn    Recommend:   -Give 225 cc N/S for 1 hour prior to cardiac cath, pending LVEDP adjust fluid rate and continue for up to four hours after procedure.  -daily BMP  -urine lytes   -renal diet   -Obtain collateral from OP nephrologist    *These are preliminary recs; case to be discussed with attending of service Dr. Javi Zhao D.O.  PGY 4 - Nephrology Fellow  [phone]     77 yo Kyrgyz/English speaking M with PMHx HTN, CKD (Stage IV, baseline Cr 2.5-3) and severe AS who is transferred from [facility] for further evaluation of his valvular disease. Nephrology consulted for CKD management.    Assessment/Plan:   #VERONICA on CKD,   Baseline creatinine of (2.5-3) Patient presenting to have valvular disease assessed, as per cardiology to go for procedure tomorrow which will require contrast load. His creatinine at the moment is 3.21, slightly elevated from his normal baseline. Patient will benefit from Poseidon protocol (POSEIDON Trial: Novel Sliding Scale Hydration Effective for the Prevention of CI-VERONICA) as follows:    "For at least an hour before receiving cardiac catheterization, to give 0.9 percent saline at 3 ml/kg. The LVEDP was measured in all patients at the start of the procedure and prior to contrast administration. In the LVEDP group, the fluid rate was adjusted according to the LVEDP (5 ml/kg/hr for LVEDP <13 mmHg; 3 ml/kg/hr for 13-18 mmHg; 1.5 ml/kg/hr for >18 mmHg). The fluid rate was set at the start of the procedure (prior to contrast exposure) and continued up until four hours after the procedure."    Reference: https://www.acc.org/latest-in-cardiology/articles/2012/10/23/10/02/bhavik    Recommend:   -Give 225 cc N/S for 1 hour prior to cardiac cath, pending LVEDP adjust fluid rate and continue for up to four hours after procedure.  -daily BMP  -urine lytes   -renal diet   -Obtain collateral from OP nephrologist      Elaizar Zhao D.O.  PGY 4 - Nephrology Fellow  [phone]

## 2021-09-09 LAB
ALBUMIN SERPL ELPH-MCNC: 4 G/DL — SIGNIFICANT CHANGE UP (ref 3.3–5)
ALP SERPL-CCNC: 157 U/L — HIGH (ref 40–120)
ALT FLD-CCNC: 17 U/L — SIGNIFICANT CHANGE UP (ref 10–45)
ANION GAP SERPL CALC-SCNC: 12 MMOL/L — SIGNIFICANT CHANGE UP (ref 5–17)
APTT BLD: 31.9 SEC — SIGNIFICANT CHANGE UP (ref 27.5–35.5)
AST SERPL-CCNC: 29 U/L — SIGNIFICANT CHANGE UP (ref 10–40)
BASOPHILS # BLD AUTO: 0.04 K/UL — SIGNIFICANT CHANGE UP (ref 0–0.2)
BASOPHILS NFR BLD AUTO: 0.8 % — SIGNIFICANT CHANGE UP (ref 0–2)
BILIRUB SERPL-MCNC: 0.7 MG/DL — SIGNIFICANT CHANGE UP (ref 0.2–1.2)
BUN SERPL-MCNC: 46 MG/DL — HIGH (ref 7–23)
CALCIUM SERPL-MCNC: 9.3 MG/DL — SIGNIFICANT CHANGE UP (ref 8.4–10.5)
CHLORIDE SERPL-SCNC: 105 MMOL/L — SIGNIFICANT CHANGE UP (ref 96–108)
CO2 SERPL-SCNC: 27 MMOL/L — SIGNIFICANT CHANGE UP (ref 22–31)
CREAT SERPL-MCNC: 3.05 MG/DL — HIGH (ref 0.5–1.3)
EOSINOPHIL # BLD AUTO: 0.38 K/UL — SIGNIFICANT CHANGE UP (ref 0–0.5)
EOSINOPHIL NFR BLD AUTO: 7.4 % — HIGH (ref 0–6)
GLUCOSE BLDC GLUCOMTR-MCNC: 106 MG/DL — HIGH (ref 70–99)
GLUCOSE BLDC GLUCOMTR-MCNC: 113 MG/DL — HIGH (ref 70–99)
GLUCOSE BLDC GLUCOMTR-MCNC: 251 MG/DL — HIGH (ref 70–99)
GLUCOSE BLDC GLUCOMTR-MCNC: 289 MG/DL — HIGH (ref 70–99)
GLUCOSE SERPL-MCNC: 117 MG/DL — HIGH (ref 70–99)
HCT VFR BLD CALC: 30.9 % — LOW (ref 39–50)
HGB BLD-MCNC: 10.1 G/DL — LOW (ref 13–17)
IMM GRANULOCYTES NFR BLD AUTO: 0.2 % — SIGNIFICANT CHANGE UP (ref 0–1.5)
INR BLD: 1.05 — SIGNIFICANT CHANGE UP (ref 0.88–1.16)
LYMPHOCYTES # BLD AUTO: 0.9 K/UL — LOW (ref 1–3.3)
LYMPHOCYTES # BLD AUTO: 17.5 % — SIGNIFICANT CHANGE UP (ref 13–44)
MAGNESIUM SERPL-MCNC: 1.9 MG/DL — SIGNIFICANT CHANGE UP (ref 1.6–2.6)
MCHC RBC-ENTMCNC: 31.6 PG — SIGNIFICANT CHANGE UP (ref 27–34)
MCHC RBC-ENTMCNC: 32.7 GM/DL — SIGNIFICANT CHANGE UP (ref 32–36)
MCV RBC AUTO: 96.6 FL — SIGNIFICANT CHANGE UP (ref 80–100)
MONOCYTES # BLD AUTO: 0.62 K/UL — SIGNIFICANT CHANGE UP (ref 0–0.9)
MONOCYTES NFR BLD AUTO: 12.1 % — SIGNIFICANT CHANGE UP (ref 2–14)
NEUTROPHILS # BLD AUTO: 3.18 K/UL — SIGNIFICANT CHANGE UP (ref 1.8–7.4)
NEUTROPHILS NFR BLD AUTO: 62 % — SIGNIFICANT CHANGE UP (ref 43–77)
NRBC # BLD: 0 /100 WBCS — SIGNIFICANT CHANGE UP (ref 0–0)
PHOSPHATE SERPL-MCNC: 3.5 MG/DL — SIGNIFICANT CHANGE UP (ref 2.5–4.5)
PLATELET # BLD AUTO: 226 K/UL — SIGNIFICANT CHANGE UP (ref 150–400)
POTASSIUM SERPL-MCNC: 4.3 MMOL/L — SIGNIFICANT CHANGE UP (ref 3.5–5.3)
POTASSIUM SERPL-SCNC: 4.3 MMOL/L — SIGNIFICANT CHANGE UP (ref 3.5–5.3)
PROT SERPL-MCNC: 6.9 G/DL — SIGNIFICANT CHANGE UP (ref 6–8.3)
PROTHROM AB SERPL-ACNC: 12.6 SEC — SIGNIFICANT CHANGE UP (ref 10.6–13.6)
RBC # BLD: 3.2 M/UL — LOW (ref 4.2–5.8)
RBC # FLD: 13.4 % — SIGNIFICANT CHANGE UP (ref 10.3–14.5)
SODIUM SERPL-SCNC: 144 MMOL/L — SIGNIFICANT CHANGE UP (ref 135–145)
T3 SERPL-MCNC: 74 NG/DL — LOW (ref 80–200)
T3 SERPL-MCNC: 79 NG/DL — LOW (ref 80–200)
T4 AB SER-ACNC: 8.22 UG/DL — SIGNIFICANT CHANGE UP (ref 4.5–11.7)
T4 FREE SERPL-MCNC: 1.45 NG/DL — SIGNIFICANT CHANGE UP (ref 0.93–1.7)
TSH SERPL-MCNC: 8.09 UIU/ML — HIGH (ref 0.27–4.2)
WBC # BLD: 5.13 K/UL — SIGNIFICANT CHANGE UP (ref 3.8–10.5)
WBC # FLD AUTO: 5.13 K/UL — SIGNIFICANT CHANGE UP (ref 3.8–10.5)

## 2021-09-09 PROCEDURE — 99223 1ST HOSP IP/OBS HIGH 75: CPT

## 2021-09-09 PROCEDURE — 99233 SBSQ HOSP IP/OBS HIGH 50: CPT

## 2021-09-09 RX ORDER — CHLORHEXIDINE GLUCONATE 213 G/1000ML
10 SOLUTION TOPICAL ONCE
Refills: 0 | Status: DISCONTINUED | OUTPATIENT
Start: 2021-09-09 | End: 2021-09-10

## 2021-09-09 RX ORDER — CHLORHEXIDINE GLUCONATE 213 G/1000ML
1 SOLUTION TOPICAL ONCE
Refills: 0 | Status: COMPLETED | OUTPATIENT
Start: 2021-09-09 | End: 2021-09-09

## 2021-09-09 RX ORDER — CHLORHEXIDINE GLUCONATE 213 G/1000ML
1 SOLUTION TOPICAL ONCE
Refills: 0 | Status: COMPLETED | OUTPATIENT
Start: 2021-09-10 | End: 2021-09-10

## 2021-09-09 RX ORDER — SODIUM CHLORIDE 9 MG/ML
1000 INJECTION INTRAMUSCULAR; INTRAVENOUS; SUBCUTANEOUS
Refills: 0 | Status: DISCONTINUED | OUTPATIENT
Start: 2021-09-09 | End: 2021-09-11

## 2021-09-09 RX ADMIN — SODIUM CHLORIDE 3 MILLILITER(S): 9 INJECTION INTRAMUSCULAR; INTRAVENOUS; SUBCUTANEOUS at 15:56

## 2021-09-09 RX ADMIN — CHLORHEXIDINE GLUCONATE 1 APPLICATION(S): 213 SOLUTION TOPICAL at 21:24

## 2021-09-09 RX ADMIN — Medication 25 MICROGRAM(S): at 05:23

## 2021-09-09 RX ADMIN — Medication 50 MILLIGRAM(S): at 00:17

## 2021-09-09 RX ADMIN — SODIUM CHLORIDE 50 MILLILITER(S): 9 INJECTION INTRAMUSCULAR; INTRAVENOUS; SUBCUTANEOUS at 10:58

## 2021-09-09 RX ADMIN — CHLORHEXIDINE GLUCONATE 1 APPLICATION(S): 213 SOLUTION TOPICAL at 20:45

## 2021-09-09 RX ADMIN — SODIUM CHLORIDE 3 MILLILITER(S): 9 INJECTION INTRAMUSCULAR; INTRAVENOUS; SUBCUTANEOUS at 21:19

## 2021-09-09 RX ADMIN — HEPARIN SODIUM 5000 UNIT(S): 5000 INJECTION INTRAVENOUS; SUBCUTANEOUS at 17:44

## 2021-09-09 RX ADMIN — ATORVASTATIN CALCIUM 40 MILLIGRAM(S): 80 TABLET, FILM COATED ORAL at 21:23

## 2021-09-09 RX ADMIN — Medication 50 MILLIGRAM(S): at 17:44

## 2021-09-09 RX ADMIN — Medication 81 MILLIGRAM(S): at 11:50

## 2021-09-09 RX ADMIN — PANTOPRAZOLE SODIUM 40 MILLIGRAM(S): 20 TABLET, DELAYED RELEASE ORAL at 05:23

## 2021-09-09 RX ADMIN — Medication 40 MILLIGRAM(S): at 05:23

## 2021-09-09 RX ADMIN — Medication 6: at 21:53

## 2021-09-09 RX ADMIN — SODIUM CHLORIDE 3 MILLILITER(S): 9 INJECTION INTRAMUSCULAR; INTRAVENOUS; SUBCUTANEOUS at 05:31

## 2021-09-09 RX ADMIN — Medication 50 MILLIGRAM(S): at 11:51

## 2021-09-09 RX ADMIN — CHLORHEXIDINE GLUCONATE 1 APPLICATION(S): 213 SOLUTION TOPICAL at 07:32

## 2021-09-09 RX ADMIN — DONEPEZIL HYDROCHLORIDE 10 MILLIGRAM(S): 10 TABLET, FILM COATED ORAL at 21:23

## 2021-09-09 RX ADMIN — HEPARIN SODIUM 5000 UNIT(S): 5000 INJECTION INTRAVENOUS; SUBCUTANEOUS at 08:41

## 2021-09-09 RX ADMIN — Medication 6: at 16:40

## 2021-09-09 RX ADMIN — AMLODIPINE BESYLATE 10 MILLIGRAM(S): 2.5 TABLET ORAL at 05:24

## 2021-09-09 NOTE — DIETITIAN INITIAL EVALUATION ADULT. - OTHER CALCULATIONS
ABW used for calculations as pt between % of IBW. Nutrient needs based on [facility] standards of care for maintenance in adults adjusted for age, renal fn and CHF, fluid per team

## 2021-09-09 NOTE — PROGRESS NOTE ADULT - SUBJECTIVE AND OBJECTIVE BOX
Planned Date of Surgery:       9/10/21                                                                                                           Surgeon: Kelsey    Procedure: BAV    HPI:  77 yo Armenian/English speaking M with PMHx HTN, HLD, IDDM, CKD (Stage IV, baseline Cr 2.5-3), carotid artery stenosis s/p carotid stents, PAD, anemia, hypothyroid, CAD, chronic dCHF, and severe AS who is transferred from [facility] for further evaluation of his valvular disease.  Patient had a recent hospitalization in July for an acute on chronic heart failure exacerbation.  At that time he underwent cardiac cath and echo cardiogram and he was diagnosed with severe AS.  He had cardiac cath and structural CT scans for TAVR work up after which he developed VERONICA and his Cr bumped to 8 but never required RRT.  He eventually was discharged home and returns to hospital a couple of days ago with fluid overload.  He was admitted for acute on chronic dCHF exacerbation, diuresed with IV lasix and symptoms and swelling improved.  He is now transferred for possible TAVR.  He is seen at the bedside with no complaints. He denies HA, dizziness, CP, current SOB, orthopnea, PND, N/V/D, or LE swelling.      Patient seen and examined at bedside today.   # [id number].  Denies chest pain, shortness of breath, nausea, vomiting.      PAST MEDICAL & SURGICAL HISTORY:  Hypertension    Hyperlipidemia    DM (diabetes mellitus)    H/O carotid artery stenosis    VISHNU (iron deficiency anemia)    H/O carotid artery stenosis  s/p carotid stent x 2 in 2019        No Known Allergies      Physical Exam  T(C): 36.1 (21 @ 09:40), Max: 36.7 (21 @ 04:40)  HR: 64 (21 @ 08:46) (56 - 65)  BP: 151/67 (21 @ 08:46) (137/63 - 163/68)  RR: 17 (21 @ 08:46) (15 - 18)  SpO2: 94% (21 @ 08:46) (91% - 96%)  GEN: NAD, looks comfortable  Psych: Mood appropriate  Neuro: A&Ox3.  No focal deficits.  Moving all extremities.   HEENT: No obvious abnormalities  CV: S1S2, regular, no murmurs appreciated.  No carotid bruits.  No JVD  Lungs: Clear B/L.  No wheezing, rales or rhonchi  ABD: Soft, non-tender, non-distended.  +Bowel sounds  EXT: Warm and well perfused.  No peripheral edema noted  Musculoskeletal: Moving all extremities with normal ROM, no joint swelling  PV: Pedal pulses palpable     MEDICATIONS  (STANDING):  amLODIPine   Tablet 10 milliGRAM(s) Oral daily  aspirin enteric coated 81 milliGRAM(s) Oral daily  atorvastatin 40 milliGRAM(s) Oral at bedtime  chlorhexidine 0.12% Liquid 5 milliLiter(s) Swish and Spit once  chlorhexidine 0.12% Liquid 10 milliLiter(s) Swish and Spit once  chlorhexidine 4% Liquid 1 Application(s) Topical once  chlorhexidine 4% Liquid 1 Application(s) Topical once  dextrose 40% Gel 15 Gram(s) Oral once  dextrose 5%. 1000 milliLiter(s) (50 mL/Hr) IV Continuous <Continuous>  dextrose 5%. 1000 milliLiter(s) (100 mL/Hr) IV Continuous <Continuous>  dextrose 50% Injectable 25 Gram(s) IV Push once  dextrose 50% Injectable 12.5 Gram(s) IV Push once  dextrose 50% Injectable 25 Gram(s) IV Push once  donepezil 10 milliGRAM(s) Oral at bedtime  furosemide    Tablet 40 milliGRAM(s) Oral daily  glucagon  Injectable 1 milliGRAM(s) IntraMuscular once  heparin   Injectable 5000 Unit(s) SubCutaneous every 8 hours  insulin lispro (ADMELOG) corrective regimen sliding scale   SubCutaneous Before meals and at bedtime  levothyroxine 25 MICROGram(s) Oral daily  metoprolol tartrate 50 milliGRAM(s) Oral every 6 hours  pantoprazole    Tablet 40 milliGRAM(s) Oral before breakfast  sodium chloride 0.9% lock flush 3 milliLiter(s) IV Push every 8 hours  sodium chloride 0.9%. 1000 milliLiter(s) (50 mL/Hr) IV Continuous <Continuous>    MEDICATIONS  (PRN):      On Beta Blocker? YES     Labs:                        10.1   5.13  )-----------( 226      ( 09 Sep 2021 07:12 )             30.9     -    144  |  105  |  46<H>  ----------------------------<  117<H>  4.3   |  27  |  3.05<H>    Ca    9.3      09 Sep 2021 07:12  Phos  3.5       Mg     1.9     -    TPro  6.9  /  Alb  4.0  /  TBili  0.7  /  DBili  x   /  AST  29  /  ALT  17  /  AlkPhos  157<H>      PT/INR - ( 09 Sep 2021 07:12 )   PT: 12.6 sec;   INR: 1.05          PTT - ( 09 Sep 2021 07:12 )  PTT:31.9 sec  Urinalysis Basic - ( 08 Sep 2021 18:22 )    Color: Yellow / Appearance: Clear / S.020 / pH: x  Gluc: x / Ketone: NEGATIVE  / Bili: Negative / Urobili: 0.2 E.U./dL   Blood: x / Protein: 100 mg/dL / Nitrite: NEGATIVE   Leuk Esterase: NEGATIVE / RBC: x / WBC 5-10 /HPF   Sq Epi: x / Non Sq Epi: 0-5 /HPF / Bacteria: Present /HPF      ABO Interpretation: O (21 @ 13:15)      CARDIAC MARKERS ( 08 Sep 2021 08:30 )  x     / 0.06 ng/mL / 152 U/L / x     / 2.3 ng/mL          Hgb A1C: 6.1     EKG:  < from: 12 Lead ECG (21 @ 07:12) >    Diagnosis Line Normal sinus rhythm  Nonspecific ST abnormality    < end of copied text >    CXR:  pending official read, bilateral congestion stable.     Echo: < from: TTE Echo Complete w/o Contrast w/ Doppler (21 @ 09:11) >  CONCLUSIONS:     1. Normal left ventricular size and systolic function.   2. Normal right ventricular size and systolic function.   3. Dilated left atrium.   4. Moderate aortic stenosis. The peak transvalvular velocity is 3.86 m/s, the mean transvalvular gradient is 28.00 mmHg, and the LVOT/AV velocity ratio is 0.33. The aortic valve area (estimated via the continuity method) is 1.12 cm².   5. Mild-to-moderate tricuspid regurgitation.   6. Pulmonary hypertension present, pulmonary artery systolicpressure is 48 mmHg.   7. No pericardial effusion.   8. No prior echo is available for comparison.    < end of copied text >      PFT's: pending     Carotid Duplex: < from: US Duplex Carotid Arteries Complete, Bilateral (21 @ 15:43) >  Bilateral internal carotid artery stents in place. Borderline elevated systolic velocities in both internal carotid arteries suggestive of mild stenosis.    < end of copied text >  Pending     Consult in Chart?  YES  Consent in Chart? YES   Pre-op Orders Placed? YES  Blood Products Ordered? YES   NPO ordered? YES  COVID? Yes

## 2021-09-09 NOTE — PROGRESS NOTE ADULT - ATTENDING COMMENTS
CKD IV baseline Cr 2.5 - 3, on IVF for ANIA ppx, pending AV repair, VERONICA improving, nonoliguric with acceptable electrolytes and volume status

## 2021-09-09 NOTE — PROGRESS NOTE ADULT - SUBJECTIVE AND OBJECTIVE BOX
Patient is a 76y Male seen and evaluated at bedside doing well this morning, states he was told his procedure will be tomorrow per cardiology. Otherwise no acute complaints voiced.      Meds:    amLODIPine   Tablet 10 daily  aspirin enteric coated 81 daily  atorvastatin 40 at bedtime  chlorhexidine 0.12% Liquid 5 once  chlorhexidine 0.12% Liquid 10 once  chlorhexidine 4% Liquid 1 once  chlorhexidine 4% Liquid 1 once  dextrose 40% Gel 15 once  dextrose 5%. 1000 <Continuous>  dextrose 5%. 1000 <Continuous>  dextrose 50% Injectable 25 once  dextrose 50% Injectable 12.5 once  dextrose 50% Injectable 25 once  donepezil 10 at bedtime  furosemide    Tablet 40 daily  glucagon  Injectable 1 once  heparin   Injectable 5000 every 8 hours  insulin lispro (ADMELOG) corrective regimen sliding scale  Before meals and at bedtime  levothyroxine 25 daily  metoprolol tartrate 50 every 6 hours  pantoprazole    Tablet 40 before breakfast  sodium chloride 0.9% lock flush 3 every 8 hours  sodium chloride 0.9%. 1000 <Continuous>      T(C): , Max: 36.7 (21 @ 04:40)  T(F): , Max: 98.1 (21 @ 04:40)  HR: 64 (21 @ 08:46)  BP: 151/67 (21 @ 08:46)  BP(mean): 96 (21 @ 08:46)  RR: 17 (21 @ 08:46)  SpO2: 94% (21 @ 08:46)  Wt(kg): --     07:01  -   @ 07:00  --------------------------------------------------------  IN: 600 mL / OUT: 0 mL / NET: 600 mL     @ 07:01  -   @ 10:12  --------------------------------------------------------  IN: 100 mL / OUT: 300 mL / NET: -200 mL          Review of Systems:  10 point ROS negative except as per HPI      PHYSICAL EXAM:  GENERAL: Alert, awake, oriented x3   HEENT: ENZO, EOMI  CHEST/LUNG: Bilateral clear breath sounds  HEART: Regular rate and rhythm, no murmur, no gallops, no rub   ABDOMEN: Soft, nontender, non distended  EXTREMITIES: no pedal edema, WWP  Neurology: AAOx3, no focal neurological deficit  SKIN: No rash or skin lesion       LABS:                        10.1   5.13  )-----------( 226      ( 09 Sep 2021 07:12 )             30.9         144  |  105  |  46<H>  ----------------------------<  117<H>  4.3   |  27  |  3.05<H>    Ca    9.3      09 Sep 2021 07:12  Phos  3.5       Mg     1.9         TPro  6.9  /  Alb  4.0  /  TBili  0.7  /  DBili  x   /  AST  29  /  ALT  17  /  AlkPhos  157<H>        PT/INR - ( 09 Sep 2021 07:12 )   PT: 12.6 sec;   INR: 1.05          PTT - ( 09 Sep 2021 07:12 )  PTT:31.9 sec  Urinalysis Basic - ( 08 Sep 2021 18:22 )    Color: Yellow / Appearance: Clear / S.020 / pH: x  Gluc: x / Ketone: NEGATIVE  / Bili: Negative / Urobili: 0.2 E.U./dL   Blood: x / Protein: 100 mg/dL / Nitrite: NEGATIVE   Leuk Esterase: NEGATIVE / RBC: x / WBC 5-10 /HPF   Sq Epi: x / Non Sq Epi: 0-5 /HPF / Bacteria: Present /HPF            RADIOLOGY & ADDITIONAL STUDIES:

## 2021-09-09 NOTE — DIETITIAN INITIAL EVALUATION ADULT. - ADD RECOMMEND
1. Encourage intake through day to meet needs 2. Manage pain 3. Trend wts 4. Reinforce ed 5. Monitor and replete lytes

## 2021-09-09 NOTE — DIETITIAN INITIAL EVALUATION ADULT. - OTHER INFO
75 yo German/English speaking M with PMHx HTN, HLD, IDDM, CKD (Stage IV, baseline Cr 2.5-3), carotid artery stenosis s/p carotid stents, PAD, anemia, hypothyroid, CAD, chronic dCHF, and severe AS who is transferred from [facility] for further evaluation of his valvular disease.  Patient had a recent hospitalization in July for an acute on chronic heart failure exacerbation.  At that time he underwent cardiac cath and echo cardiogram and he was diagnosed with severe AS.  He had cardiac cath and structural CT scans for TAVR work up after which he developed VERONICA and his Cr bumped to 8 but never required RRT.  He eventually was discharged home and returns to hospital a couple of days ago with fluid overload.  He was admitted for acute on chronic dCHF exacerbation, diuresed with IV lasix and symptoms and swelling improved.  He is now transferred for possible TAVR. Plan is for BAV 9/10.   At time of assessment pt resting in bed noting no 77 yo South Sudanese/English speaking M with PMHx HTN, HLD, IDDM, CKD (Stage IV, baseline Cr 2.5-3), carotid artery stenosis s/p carotid stents, PAD, anemia, hypothyroid, CAD, chronic dCHF, and severe AS who is transferred from [facility] for further evaluation of his valvular disease.  Patient had a recent hospitalization in July for an acute on chronic heart failure exacerbation.  At that time he underwent cardiac cath and echo cardiogram and he was diagnosed with severe AS.  He had cardiac cath and structural CT scans for TAVR work up after which he developed VERONICA and his Cr bumped to 8 but never required RRT.  He eventually was discharged home and returns to hospital a couple of days ago with fluid overload.  He was admitted for acute on chronic dCHF exacerbation, diuresed with IV lasix and symptoms and swelling improved.  He is now transferred for possible TAVR. Plan is for BAV 9/10.   At time of assessment pt resting in bed noting no apparent distress. Denies n/v/d/c, chewing/ swallowing issues noted, skin with no edema, keyon 20. No pain noted. Encouraged intake through day and discussed DASH / renal diet. Would liberalize diet from renal as pt not on HD and diet is very restrictive. Understand why must be on DASH diet. NKFA or wt changes reported - feels wt fluctuates due to fluid from renal fn and CHF. Will follow per protocol and reinforce ed after procedure.

## 2021-09-09 NOTE — CONSULT NOTE ADULT - SUBJECTIVE AND OBJECTIVE BOX
Patient is a 76y old  Male who presents with a chief complaint of   HPI:  77 yo Turkish/English speaking M with PMHx HTN, HLD, IDDM, CKD (Stage IV, baseline Cr 2.5-3), carotid artery stenosis s/p carotid stents, PAD, anemia, hypothyroid, CAD, chronic dCHF, and severe AS who is transferred from [facility] for further evaluation of his valvular disease.  Patient had a recent hospitalization in July for an acute on chronic heart failure exacerbation.  At that time he underwent cardiac cath and echo cardiogram and he was diagnosed with severe AS.  He had cardiac cath and structural CT scans for TAVR work up after which he developed VERONICA and his Cr bumped to 8 but never required RRT.  He eventually was discharged home and returns to hospital a couple of days ago with fluid overload.  He was admitted for acute on chronic dCHF exacerbation, diuresed with IV lasix and symptoms and swelling improved.  He is now transferred for possible TAVR.  He is seen at the bedside with no complaints.  he denies HA, dizziness, CP, current SOB, orthopnea, PND, N/V/D, or LE swelling.     (07 Sep 2021 22:45)    Consultant HPI: AM BG usually 100-120 and -200 at home. Pt takes his 15U tresiba every night with minimal PM snacks. The patient also takes his synthroid appropriately every morning. Pt has Type 2 DM and follows primarily with PCP for hypothyroid and DM     Interval Events: 5pm 9/8 BG was 215 with a PM BG of 114. No insulin given and pt NPO at MN. AM 9/9 . Cr this AM back at baseline of 3.05.       FAMILY HISTORY:  FH: diabetes mellitus      PAST MEDICAL & SURGICAL HISTORY:  Hypertension    Hyperlipidemia    DM (diabetes mellitus)    H/O carotid artery stenosis    VISHNU (iron deficiency anemia)    H/O carotid artery stenosis  s/p carotid stent x 2 in 2019      Birth History:  Developmental History:    Allergies    No Known Allergies    Intolerances      MEDICATIONS  (STANDING):  amLODIPine   Tablet 10 milliGRAM(s) Oral daily  aspirin enteric coated 81 milliGRAM(s) Oral daily  atorvastatin 40 milliGRAM(s) Oral at bedtime  chlorhexidine 0.12% Liquid 5 milliLiter(s) Swish and Spit once  chlorhexidine 4% Liquid 1 Application(s) Topical once  chlorhexidine 4% Liquid 1 Application(s) Topical once  dextrose 40% Gel 15 Gram(s) Oral once  dextrose 5%. 1000 milliLiter(s) (50 mL/Hr) IV Continuous <Continuous>  dextrose 5%. 1000 milliLiter(s) (100 mL/Hr) IV Continuous <Continuous>  dextrose 50% Injectable 25 Gram(s) IV Push once  dextrose 50% Injectable 12.5 Gram(s) IV Push once  dextrose 50% Injectable 25 Gram(s) IV Push once  donepezil 10 milliGRAM(s) Oral at bedtime  furosemide    Tablet 40 milliGRAM(s) Oral daily  glucagon  Injectable 1 milliGRAM(s) IntraMuscular once  heparin   Injectable 5000 Unit(s) SubCutaneous every 8 hours  insulin lispro (ADMELOG) corrective regimen sliding scale   SubCutaneous Before meals and at bedtime  levothyroxine 25 MICROGram(s) Oral daily  metoprolol tartrate 50 milliGRAM(s) Oral every 6 hours  pantoprazole    Tablet 40 milliGRAM(s) Oral before breakfast  sodium chloride 0.9% lock flush 3 milliLiter(s) IV Push every 8 hours  sodium chloride 0.9%. 1000 milliLiter(s) (50 mL/Hr) IV Continuous <Continuous>    MEDICATIONS  (PRN):      Vital Signs Last 24 Hrs  T(C): 37.4 (08 Sep 2021 04:40), Max: 37.4 (08 Sep 2021 04:40)  T(F): 99.4 (08 Sep 2021 04:40), Max: 99.4 (08 Sep 2021 04:40)  HR: 65 (08 Sep 2021 16:25) (52 - 69)  BP: 144/63 (08 Sep 2021 16:25) (125/55 - 194/76)  BP(mean): 91 (08 Sep 2021 16:25) (79 - 109)  RR: 15 (08 Sep 2021 16:25) (15 - 18)  SpO2: 96% (08 Sep 2021 16:25) (91% - 96%)  Height (cm): 165.1 (09-07 @ 22:14)  Weight (kg): 71.4 (09-07 @ 22:14)  BMI (kg/m2): 26.2 (09-07 @ 22:14)      LABS                          10.9   4.67  )-----------( 232      ( 08 Sep 2021 08:30 )             33.7     09-08    143  |  104  |  49<H>  ----------------------------<  172<H>  4.1   |  28  |  3.21<H>    Ca    9.2      08 Sep 2021 08:30  Mg     1.9     09-08    TPro  6.7  /  Alb  3.8  /  TBili  0.5  /  DBili  <0.2  /  AST  32  /  ALT  19  /  AlkPhos  153<H>  09-08        CAPILLARY BLOOD GLUCOSE      POCT Blood Glucose.: 90 mg/dL (08 Sep 2021 11:43)  POCT Blood Glucose.: 158 mg/dL (08 Sep 2021 08:09)  POCT Blood Glucose.: 52 mg/dL (08 Sep 2021 07:13)   Patient on metformin and glimeperide at home. HgA1C of 6.0.   -ISS+Lantus 17U+ Lispro 2U  -monitor FSG Patient on metformin and glimeperide at home. HgA1C of 6.0.   -ISS+Lantus 17U, will start metformin 500mg BID  -monitor FSG

## 2021-09-09 NOTE — DIETITIAN INITIAL EVALUATION ADULT. - PERSON TAUGHT/METHOD
encouraged intake through day to meet needs, discussed DASH diet/verbal instruction/patient instructed

## 2021-09-09 NOTE — CONSULT NOTE ADULT - ASSESSMENT
76 year old male with PMHx of HTN, HLD, IDDM, CKD (Stage IV baseline Cr 2.5-3), carotid artery stenosis s/p stenting, PAD, Anemia, Hypothyroid, CAD, Chronic diastolic CHF and known aortic stenosis with recent hospitalization in July for acute on chronic CHF exacerbation presented to [facility] complaining of worsening fluid overload. He was admitted for acute on chronic CHF exacerbation, diuresed with IV lasix with improvement. He underwent cardiac cath in July 2021 confirming severe AS. He is now transferred to [facility] under the care of  Dr. Cole for further workup.  Planned for BAV tomorrow.     #DM2   Recommendations:  - Continue to hold all standing insulin, only moderate insulin sliding scale   - C/w POC glucose testing pre-meals and at night time     #Hypothyroidism  Recommendations:  - Will follow-up repeat TFTs from this AM, PLEASE ADD ON FREE T4  - No dose adjustment at this time      Pending discussion with Dr. Curtis     76 year old male with PMHx of HTN, HLD, IDDM, CKD (Stage IV baseline Cr 2.5-3), carotid artery stenosis s/p stenting, PAD, Anemia, Hypothyroid, CAD, Chronic diastolic CHF and known aortic stenosis with recent hospitalization in July for acute on chronic CHF exacerbation presented to [facility] complaining of worsening fluid overload. He was admitted for acute on chronic CHF exacerbation, diuresed with IV lasix with improvement. He underwent cardiac cath in July 2021 confirming severe AS. He is now transferred to [facility] under the care of  Dr. Cole for further workup.  Planned for BAV tomorrow.     #DM2   Recommendations:  - Continue to hold all standing insulin, only moderate insulin sliding scale   - C/w POC glucose testing pre-meals and at night time     #Hypothyroidism  TSH 8  Recommendations:  - PLEASE ADD ON FREE T4  - No dose adjustment at this time      Pending discussion with Dr. Curtis

## 2021-09-10 LAB
ALBUMIN SERPL ELPH-MCNC: 3.7 G/DL — SIGNIFICANT CHANGE UP (ref 3.3–5)
ALBUMIN SERPL ELPH-MCNC: 3.9 G/DL — SIGNIFICANT CHANGE UP (ref 3.3–5)
ALP SERPL-CCNC: 145 U/L — HIGH (ref 40–120)
ALP SERPL-CCNC: 166 U/L — HIGH (ref 40–120)
ALT FLD-CCNC: 21 U/L — SIGNIFICANT CHANGE UP (ref 10–45)
ALT FLD-CCNC: 21 U/L — SIGNIFICANT CHANGE UP (ref 10–45)
ANION GAP SERPL CALC-SCNC: 12 MMOL/L — SIGNIFICANT CHANGE UP (ref 5–17)
ANION GAP SERPL CALC-SCNC: 14 MMOL/L — SIGNIFICANT CHANGE UP (ref 5–17)
APTT BLD: 31.1 SEC — SIGNIFICANT CHANGE UP (ref 27.5–35.5)
APTT BLD: 31.9 SEC — SIGNIFICANT CHANGE UP (ref 27.5–35.5)
AST SERPL-CCNC: 32 U/L — SIGNIFICANT CHANGE UP (ref 10–40)
AST SERPL-CCNC: 34 U/L — SIGNIFICANT CHANGE UP (ref 10–40)
BASOPHILS # BLD AUTO: 0.02 K/UL — SIGNIFICANT CHANGE UP (ref 0–0.2)
BASOPHILS # BLD AUTO: 0.04 K/UL — SIGNIFICANT CHANGE UP (ref 0–0.2)
BASOPHILS NFR BLD AUTO: 0.4 % — SIGNIFICANT CHANGE UP (ref 0–2)
BASOPHILS NFR BLD AUTO: 0.7 % — SIGNIFICANT CHANGE UP (ref 0–2)
BILIRUB SERPL-MCNC: 0.4 MG/DL — SIGNIFICANT CHANGE UP (ref 0.2–1.2)
BILIRUB SERPL-MCNC: 0.5 MG/DL — SIGNIFICANT CHANGE UP (ref 0.2–1.2)
BLD GP AB SCN SERPL QL: NEGATIVE — SIGNIFICANT CHANGE UP
BUN SERPL-MCNC: 52 MG/DL — HIGH (ref 7–23)
BUN SERPL-MCNC: 56 MG/DL — HIGH (ref 7–23)
CALCIUM SERPL-MCNC: 9.1 MG/DL — SIGNIFICANT CHANGE UP (ref 8.4–10.5)
CALCIUM SERPL-MCNC: 9.3 MG/DL — SIGNIFICANT CHANGE UP (ref 8.4–10.5)
CHLORIDE SERPL-SCNC: 107 MMOL/L — SIGNIFICANT CHANGE UP (ref 96–108)
CHLORIDE SERPL-SCNC: 107 MMOL/L — SIGNIFICANT CHANGE UP (ref 96–108)
CO2 SERPL-SCNC: 24 MMOL/L — SIGNIFICANT CHANGE UP (ref 22–31)
CO2 SERPL-SCNC: 25 MMOL/L — SIGNIFICANT CHANGE UP (ref 22–31)
CREAT SERPL-MCNC: 3.12 MG/DL — HIGH (ref 0.5–1.3)
CREAT SERPL-MCNC: 3.16 MG/DL — HIGH (ref 0.5–1.3)
EOSINOPHIL # BLD AUTO: 0.23 K/UL — SIGNIFICANT CHANGE UP (ref 0–0.5)
EOSINOPHIL # BLD AUTO: 0.36 K/UL — SIGNIFICANT CHANGE UP (ref 0–0.5)
EOSINOPHIL NFR BLD AUTO: 4.3 % — SIGNIFICANT CHANGE UP (ref 0–6)
EOSINOPHIL NFR BLD AUTO: 6.3 % — HIGH (ref 0–6)
GAS PNL BLDA: SIGNIFICANT CHANGE UP
GLUCOSE BLDC GLUCOMTR-MCNC: 110 MG/DL — HIGH (ref 70–99)
GLUCOSE BLDC GLUCOMTR-MCNC: 143 MG/DL — HIGH (ref 70–99)
GLUCOSE BLDC GLUCOMTR-MCNC: 156 MG/DL — HIGH (ref 70–99)
GLUCOSE BLDC GLUCOMTR-MCNC: 244 MG/DL — HIGH (ref 70–99)
GLUCOSE SERPL-MCNC: 155 MG/DL — HIGH (ref 70–99)
GLUCOSE SERPL-MCNC: 162 MG/DL — HIGH (ref 70–99)
HCT VFR BLD CALC: 27 % — LOW (ref 39–50)
HCT VFR BLD CALC: 30.6 % — LOW (ref 39–50)
HGB BLD-MCNC: 10 G/DL — LOW (ref 13–17)
HGB BLD-MCNC: 8.6 G/DL — LOW (ref 13–17)
IMM GRANULOCYTES NFR BLD AUTO: 0.2 % — SIGNIFICANT CHANGE UP (ref 0–1.5)
IMM GRANULOCYTES NFR BLD AUTO: 0.8 % — SIGNIFICANT CHANGE UP (ref 0–1.5)
INR BLD: 1.03 — SIGNIFICANT CHANGE UP (ref 0.88–1.16)
INR BLD: 1.1 — SIGNIFICANT CHANGE UP (ref 0.88–1.16)
LYMPHOCYTES # BLD AUTO: 0.58 K/UL — LOW (ref 1–3.3)
LYMPHOCYTES # BLD AUTO: 0.83 K/UL — LOW (ref 1–3.3)
LYMPHOCYTES # BLD AUTO: 10.9 % — LOW (ref 13–44)
LYMPHOCYTES # BLD AUTO: 14.5 % — SIGNIFICANT CHANGE UP (ref 13–44)
MAGNESIUM SERPL-MCNC: 1.9 MG/DL — SIGNIFICANT CHANGE UP (ref 1.6–2.6)
MCHC RBC-ENTMCNC: 30.4 PG — SIGNIFICANT CHANGE UP (ref 27–34)
MCHC RBC-ENTMCNC: 31.3 PG — SIGNIFICANT CHANGE UP (ref 27–34)
MCHC RBC-ENTMCNC: 31.9 GM/DL — LOW (ref 32–36)
MCHC RBC-ENTMCNC: 32.7 GM/DL — SIGNIFICANT CHANGE UP (ref 32–36)
MCV RBC AUTO: 95.4 FL — SIGNIFICANT CHANGE UP (ref 80–100)
MCV RBC AUTO: 95.9 FL — SIGNIFICANT CHANGE UP (ref 80–100)
MONOCYTES # BLD AUTO: 0.49 K/UL — SIGNIFICANT CHANGE UP (ref 0–0.9)
MONOCYTES # BLD AUTO: 0.63 K/UL — SIGNIFICANT CHANGE UP (ref 0–0.9)
MONOCYTES NFR BLD AUTO: 11 % — SIGNIFICANT CHANGE UP (ref 2–14)
MONOCYTES NFR BLD AUTO: 9.2 % — SIGNIFICANT CHANGE UP (ref 2–14)
NEUTROPHILS # BLD AUTO: 3.87 K/UL — SIGNIFICANT CHANGE UP (ref 1.8–7.4)
NEUTROPHILS # BLD AUTO: 3.95 K/UL — SIGNIFICANT CHANGE UP (ref 1.8–7.4)
NEUTROPHILS NFR BLD AUTO: 67.3 % — SIGNIFICANT CHANGE UP (ref 43–77)
NEUTROPHILS NFR BLD AUTO: 74.4 % — SIGNIFICANT CHANGE UP (ref 43–77)
NRBC # BLD: 0 /100 WBCS — SIGNIFICANT CHANGE UP (ref 0–0)
NRBC # BLD: 0 /100 WBCS — SIGNIFICANT CHANGE UP (ref 0–0)
PLATELET # BLD AUTO: 178 K/UL — SIGNIFICANT CHANGE UP (ref 150–400)
PLATELET # BLD AUTO: 201 K/UL — SIGNIFICANT CHANGE UP (ref 150–400)
POTASSIUM SERPL-MCNC: 4.3 MMOL/L — SIGNIFICANT CHANGE UP (ref 3.5–5.3)
POTASSIUM SERPL-MCNC: 4.4 MMOL/L — SIGNIFICANT CHANGE UP (ref 3.5–5.3)
POTASSIUM SERPL-SCNC: 4.3 MMOL/L — SIGNIFICANT CHANGE UP (ref 3.5–5.3)
POTASSIUM SERPL-SCNC: 4.4 MMOL/L — SIGNIFICANT CHANGE UP (ref 3.5–5.3)
PROT SERPL-MCNC: 6.3 G/DL — SIGNIFICANT CHANGE UP (ref 6–8.3)
PROT SERPL-MCNC: 6.9 G/DL — SIGNIFICANT CHANGE UP (ref 6–8.3)
PROTHROM AB SERPL-ACNC: 12.3 SEC — SIGNIFICANT CHANGE UP (ref 10.6–13.6)
PROTHROM AB SERPL-ACNC: 13.1 SEC — SIGNIFICANT CHANGE UP (ref 10.6–13.6)
RBC # BLD: 2.83 M/UL — LOW (ref 4.2–5.8)
RBC # BLD: 3.19 M/UL — LOW (ref 4.2–5.8)
RBC # FLD: 13.2 % — SIGNIFICANT CHANGE UP (ref 10.3–14.5)
RBC # FLD: 13.3 % — SIGNIFICANT CHANGE UP (ref 10.3–14.5)
RH IG SCN BLD-IMP: POSITIVE — SIGNIFICANT CHANGE UP
SODIUM SERPL-SCNC: 144 MMOL/L — SIGNIFICANT CHANGE UP (ref 135–145)
SODIUM SERPL-SCNC: 145 MMOL/L — SIGNIFICANT CHANGE UP (ref 135–145)
WBC # BLD: 5.31 K/UL — SIGNIFICANT CHANGE UP (ref 3.8–10.5)
WBC # BLD: 5.74 K/UL — SIGNIFICANT CHANGE UP (ref 3.8–10.5)
WBC # FLD AUTO: 5.31 K/UL — SIGNIFICANT CHANGE UP (ref 3.8–10.5)
WBC # FLD AUTO: 5.74 K/UL — SIGNIFICANT CHANGE UP (ref 3.8–10.5)

## 2021-09-10 PROCEDURE — 99232 SBSQ HOSP IP/OBS MODERATE 35: CPT | Mod: GC

## 2021-09-10 PROCEDURE — 92986 REVISION OF AORTIC VALVE: CPT

## 2021-09-10 PROCEDURE — 71045 X-RAY EXAM CHEST 1 VIEW: CPT | Mod: 26

## 2021-09-10 RX ORDER — CHLORHEXIDINE GLUCONATE 213 G/1000ML
5 SOLUTION TOPICAL
Refills: 0 | Status: DISCONTINUED | OUTPATIENT
Start: 2021-09-10 | End: 2021-09-11

## 2021-09-10 RX ORDER — MEPERIDINE HYDROCHLORIDE 50 MG/ML
25 INJECTION INTRAMUSCULAR; INTRAVENOUS; SUBCUTANEOUS ONCE
Refills: 0 | Status: DISCONTINUED | OUTPATIENT
Start: 2021-09-10 | End: 2021-09-10

## 2021-09-10 RX ORDER — SODIUM CHLORIDE 9 MG/ML
1000 INJECTION INTRAMUSCULAR; INTRAVENOUS; SUBCUTANEOUS
Refills: 0 | Status: DISCONTINUED | OUTPATIENT
Start: 2021-09-10 | End: 2021-09-11

## 2021-09-10 RX ORDER — INFLUENZA VIRUS VACCINE 15; 15; 15; 15 UG/.5ML; UG/.5ML; UG/.5ML; UG/.5ML
0.7 SUSPENSION INTRAMUSCULAR ONCE
Refills: 0 | Status: DISCONTINUED | OUTPATIENT
Start: 2021-09-10 | End: 2021-09-11

## 2021-09-10 RX ORDER — INFLUENZA VIRUS VACCINE 15; 15; 15; 15 UG/.5ML; UG/.5ML; UG/.5ML; UG/.5ML
0.5 SUSPENSION INTRAMUSCULAR ONCE
Refills: 0 | Status: DISCONTINUED | OUTPATIENT
Start: 2021-09-10 | End: 2021-09-10

## 2021-09-10 RX ORDER — CEFAZOLIN SODIUM 1 G
1000 VIAL (EA) INJECTION ONCE
Refills: 0 | Status: COMPLETED | OUTPATIENT
Start: 2021-09-11 | End: 2021-09-11

## 2021-09-10 RX ADMIN — Medication 2: at 05:45

## 2021-09-10 RX ADMIN — PANTOPRAZOLE SODIUM 40 MILLIGRAM(S): 20 TABLET, DELAYED RELEASE ORAL at 05:44

## 2021-09-10 RX ADMIN — Medication 4: at 21:28

## 2021-09-10 RX ADMIN — Medication 50 MILLIGRAM(S): at 17:26

## 2021-09-10 RX ADMIN — SODIUM CHLORIDE 3 MILLILITER(S): 9 INJECTION INTRAMUSCULAR; INTRAVENOUS; SUBCUTANEOUS at 05:37

## 2021-09-10 RX ADMIN — CHLORHEXIDINE GLUCONATE 1 APPLICATION(S): 213 SOLUTION TOPICAL at 05:14

## 2021-09-10 RX ADMIN — Medication 81 MILLIGRAM(S): at 08:55

## 2021-09-10 RX ADMIN — HEPARIN SODIUM 5000 UNIT(S): 5000 INJECTION INTRAVENOUS; SUBCUTANEOUS at 08:55

## 2021-09-10 RX ADMIN — AMLODIPINE BESYLATE 10 MILLIGRAM(S): 2.5 TABLET ORAL at 05:43

## 2021-09-10 RX ADMIN — Medication 40 MILLIGRAM(S): at 05:43

## 2021-09-10 RX ADMIN — SODIUM CHLORIDE 3 MILLILITER(S): 9 INJECTION INTRAMUSCULAR; INTRAVENOUS; SUBCUTANEOUS at 21:13

## 2021-09-10 RX ADMIN — CHLORHEXIDINE GLUCONATE 5 MILLILITER(S): 213 SOLUTION TOPICAL at 17:26

## 2021-09-10 RX ADMIN — HEPARIN SODIUM 5000 UNIT(S): 5000 INJECTION INTRAVENOUS; SUBCUTANEOUS at 17:26

## 2021-09-10 RX ADMIN — SODIUM CHLORIDE 10 MILLILITER(S): 9 INJECTION INTRAMUSCULAR; INTRAVENOUS; SUBCUTANEOUS at 21:28

## 2021-09-10 RX ADMIN — Medication 25 MICROGRAM(S): at 05:43

## 2021-09-10 RX ADMIN — SODIUM CHLORIDE 3 MILLILITER(S): 9 INJECTION INTRAMUSCULAR; INTRAVENOUS; SUBCUTANEOUS at 14:20

## 2021-09-10 RX ADMIN — Medication 50 MILLIGRAM(S): at 05:43

## 2021-09-10 RX ADMIN — DONEPEZIL HYDROCHLORIDE 10 MILLIGRAM(S): 10 TABLET, FILM COATED ORAL at 22:06

## 2021-09-10 RX ADMIN — ATORVASTATIN CALCIUM 40 MILLIGRAM(S): 80 TABLET, FILM COATED ORAL at 21:19

## 2021-09-10 NOTE — CONSULT NOTE ADULT - ASSESSMENT
76 year old male with PMHx of HTN, HLD, IDDM, CKD (Stage IV baseline Cr 2.5-3), carotid artery stenosis s/p stenting, PAD, Anemia, Hypothyroid, CAD, Chronic diastolic CHF and known aortic stenosis with recent hospitalization in July for acute on chronic CHF exacerbation presented to [facility] complaining of worsening fluid overload. He was admitted for acute on chronic CHF exacerbation, diuresed with IV lasix with improvement. He underwent cardiac cath in July 2021 confirming severe AS. He is now transferred to [facility] under the care of  Dr. Cole for further workup.  Planned for BAV tomorrow.     #DM2   Recommendations:  - Hold all standing insulin, only moderate insulin sliding scale   - C/w POC glucose testing pre-meals and at night time     #Hypothyroidism  Recommendations:  TSH elevated and normal free T4  - No dose adjustment at this time      Pending discussion with Dr. Curtis     76 year old male with PMHx of HTN, HLD, IDDM, CKD (Stage IV baseline Cr 2.5-3), carotid artery stenosis s/p stenting, PAD, Anemia, Hypothyroid, CAD, Chronic diastolic CHF and known aortic stenosis with recent hospitalization in July for acute on chronic CHF exacerbation presented to [facility] complaining of worsening fluid overload. He was admitted for acute on chronic CHF exacerbation, diuresed with IV lasix with improvement. He underwent cardiac cath in July 2021 confirming severe AS. He is now transferred to [facility] under the care of  Dr. Cole for further workup.  Planned for BAV tomorrow.     #DM2   Recommendations:  - Hold all standing insulin, only moderate insulin sliding scale   - C/w POC glucose testing pre-meals and at night time     #Hypothyroidism  Recommendations:  TSH elevated and normal free T4  - No dose adjustment at this time      Discussed with Dr. Curtis

## 2021-09-10 NOTE — CONSULT NOTE ADULT - SUBJECTIVE AND OBJECTIVE BOX
Request for consultation:  Requested by:    Patient is a 76y old  Male who presents with a chief complaint of   HPI:  77 yo Faroese/English speaking M with PMHx HTN, HLD, IDDM, CKD (Stage IV, baseline Cr 2.5-3), carotid artery stenosis s/p carotid stents, PAD, anemia, hypothyroid, CAD, chronic dCHF, and severe AS who is transferred from [facility] for further evaluation of his valvular disease.  Patient had a recent hospitalization in July for an acute on chronic heart failure exacerbation.  At that time he underwent cardiac cath and echo cardiogram and he was diagnosed with severe AS.  He had cardiac cath and structural CT scans for TAVR work up after which he developed VERONICA and his Cr bumped to 8 but never required RRT.  He eventually was discharged home and returns to hospital a couple of days ago with fluid overload.  He was admitted for acute on chronic dCHF exacerbation, diuresed with IV lasix and symptoms and swelling improved.  He is now transferred for possible TAVR.  He is seen at the bedside with no complaints.  he denies HA, dizziness, CP, current SOB, orthopnea, PND, N/V/D, or LE swelling.     (07 Sep 2021 22:45)    Consultant HPI: AM BG usually 100-120 and -200. Pt takes his 15U tresiba every night with minimal PM snacks. The patient also takes his synthroid appropriately every morning.     Interval events: Underwent AV BAV today. Pt did not have lunch on 9/9 but had salmon and rice on 9/9 for dinner. 9/9 5pm  with 6U ISS, and 9/9 Bedtime  6U ISS then 9/10AM 156 2U ISS.        FAMILY HISTORY:  FH: diabetes mellitus      PAST MEDICAL & SURGICAL HISTORY:  Hypertension    Hyperlipidemia    DM (diabetes mellitus)    H/O carotid artery stenosis    VISHNU (iron deficiency anemia)    H/O carotid artery stenosis  s/p carotid stent x 2 in 2019      Birth History:  Developmental History:    Allergies    No Known Allergies    Intolerances      MEDICATIONS  (STANDING):  amLODIPine   Tablet 10 milliGRAM(s) Oral daily  aspirin enteric coated 81 milliGRAM(s) Oral daily  atorvastatin 40 milliGRAM(s) Oral at bedtime  chlorhexidine 0.12% Liquid 5 milliLiter(s) Swish and Spit once  chlorhexidine 4% Liquid 1 Application(s) Topical once  chlorhexidine 4% Liquid 1 Application(s) Topical once  dextrose 40% Gel 15 Gram(s) Oral once  dextrose 5%. 1000 milliLiter(s) (50 mL/Hr) IV Continuous <Continuous>  dextrose 5%. 1000 milliLiter(s) (100 mL/Hr) IV Continuous <Continuous>  dextrose 50% Injectable 25 Gram(s) IV Push once  dextrose 50% Injectable 12.5 Gram(s) IV Push once  dextrose 50% Injectable 25 Gram(s) IV Push once  donepezil 10 milliGRAM(s) Oral at bedtime  furosemide    Tablet 40 milliGRAM(s) Oral daily  glucagon  Injectable 1 milliGRAM(s) IntraMuscular once  heparin   Injectable 5000 Unit(s) SubCutaneous every 8 hours  insulin lispro (ADMELOG) corrective regimen sliding scale   SubCutaneous Before meals and at bedtime  levothyroxine 25 MICROGram(s) Oral daily  metoprolol tartrate 50 milliGRAM(s) Oral every 6 hours  pantoprazole    Tablet 40 milliGRAM(s) Oral before breakfast  sodium chloride 0.9% lock flush 3 milliLiter(s) IV Push every 8 hours  sodium chloride 0.9%. 1000 milliLiter(s) (50 mL/Hr) IV Continuous <Continuous>    MEDICATIONS  (PRN):      Vital Signs Last 24 Hrs  T(C): 37.4 (08 Sep 2021 04:40), Max: 37.4 (08 Sep 2021 04:40)  T(F): 99.4 (08 Sep 2021 04:40), Max: 99.4 (08 Sep 2021 04:40)  HR: 65 (08 Sep 2021 16:25) (52 - 69)  BP: 144/63 (08 Sep 2021 16:25) (125/55 - 194/76)  BP(mean): 91 (08 Sep 2021 16:25) (79 - 109)  RR: 15 (08 Sep 2021 16:25) (15 - 18)  SpO2: 96% (08 Sep 2021 16:25) (91% - 96%)  Height (cm): 165.1 (09-07 @ 22:14)  Weight (kg): 71.4 (09-07 @ 22:14)  BMI (kg/m2): 26.2 (09-07 @ 22:14)      LABS                          10.9   4.67  )-----------( 232      ( 08 Sep 2021 08:30 )             33.7     09-08    143  |  104  |  49<H>  ----------------------------<  172<H>  4.1   |  28  |  3.21<H>    Ca    9.2      08 Sep 2021 08:30  Mg     1.9     09-08    TPro  6.7  /  Alb  3.8  /  TBili  0.5  /  DBili  <0.2  /  AST  32  /  ALT  19  /  AlkPhos  153<H>  09-08        CAPILLARY BLOOD GLUCOSE      POCT Blood Glucose.: 90 mg/dL (08 Sep 2021 11:43)  POCT Blood Glucose.: 158 mg/dL (08 Sep 2021 08:09)  POCT Blood Glucose.: 52 mg/dL (08 Sep 2021 07:13)   Request for consultation:  Requested by:    Patient is a 76y old  Male who presents with a chief complaint of   HPI:  77 yo Chinese/English speaking M with PMHx HTN, HLD, IDDM, CKD (Stage IV, baseline Cr 2.5-3), carotid artery stenosis s/p carotid stents, PAD, anemia, hypothyroid, CAD, chronic dCHF, and severe AS who is transferred from [facility] for further evaluation of his valvular disease.  Patient had a recent hospitalization in July for an acute on chronic heart failure exacerbation.  At that time he underwent cardiac cath and echo cardiogram and he was diagnosed with severe AS.  He had cardiac cath and structural CT scans for TAVR work up after which he developed VERONICA and his Cr bumped to 8 but never required RRT.  He eventually was discharged home and returns to hospital a couple of days ago with fluid overload.  He was admitted for acute on chronic dCHF exacerbation, diuresed with IV lasix and symptoms and swelling improved.  He is now transferred for possible TAVR.  He is seen at the bedside with no complaints.  he denies HA, dizziness, CP, current SOB, orthopnea, PND, N/V/D, or LE swelling.     (07 Sep 2021 22:45)    Consultant HPI: AM BG usually 100-120 and -200. Pt takes his 15U tresiba every night with minimal PM snacks. The patient also takes his synthroid appropriately every morning.     Interval events: Underwent AV BAV today. Pt did not have lunch on 9/9 but had salmon and rice on 9/9 for dinner. 9/9 5pm  with 6U ISS, and 9/9 Bedtime  6U ISS then 9/10AM 156 2U ISS.        FAMILY HISTORY:  FH: diabetes mellitus      PAST MEDICAL & SURGICAL HISTORY:  Hypertension    Hyperlipidemia    DM (diabetes mellitus)    H/O carotid artery stenosis    VISHNU (iron deficiency anemia)    H/O carotid artery stenosis  s/p carotid stent x 2 in 2019      Birth History:  Developmental History:    Allergies    No Known Allergies    Intolerances      MEDICATIONS  (STANDING):  amLODIPine   Tablet 10 milliGRAM(s) Oral daily  aspirin enteric coated 81 milliGRAM(s) Oral daily  atorvastatin 40 milliGRAM(s) Oral at bedtime  chlorhexidine 0.12% Liquid 5 milliLiter(s) Swish and Spit once  chlorhexidine 4% Liquid 1 Application(s) Topical once  chlorhexidine 4% Liquid 1 Application(s) Topical once  dextrose 40% Gel 15 Gram(s) Oral once  dextrose 5%. 1000 milliLiter(s) (50 mL/Hr) IV Continuous <Continuous>  dextrose 5%. 1000 milliLiter(s) (100 mL/Hr) IV Continuous <Continuous>  dextrose 50% Injectable 25 Gram(s) IV Push once  dextrose 50% Injectable 12.5 Gram(s) IV Push once  dextrose 50% Injectable 25 Gram(s) IV Push once  donepezil 10 milliGRAM(s) Oral at bedtime  furosemide    Tablet 40 milliGRAM(s) Oral daily  glucagon  Injectable 1 milliGRAM(s) IntraMuscular once  heparin   Injectable 5000 Unit(s) SubCutaneous every 8 hours  insulin lispro (ADMELOG) corrective regimen sliding scale   SubCutaneous Before meals and at bedtime  levothyroxine 25 MICROGram(s) Oral daily  metoprolol tartrate 50 milliGRAM(s) Oral every 6 hours  pantoprazole    Tablet 40 milliGRAM(s) Oral before breakfast  sodium chloride 0.9% lock flush 3 milliLiter(s) IV Push every 8 hours  sodium chloride 0.9%. 1000 milliLiter(s) (50 mL/Hr) IV Continuous <Continuous>    MEDICATIONS  (PRN):      Vital Signs Last 24 Hrs  T(C): 37.4 (08 Sep 2021 04:40), Max: 37.4 (08 Sep 2021 04:40)  T(F): 99.4 (08 Sep 2021 04:40), Max: 99.4 (08 Sep 2021 04:40)  HR: 65 (08 Sep 2021 16:25) (52 - 69)  BP: 144/63 (08 Sep 2021 16:25) (125/55 - 194/76)  BP(mean): 91 (08 Sep 2021 16:25) (79 - 109)  RR: 15 (08 Sep 2021 16:25) (15 - 18)  SpO2: 96% (08 Sep 2021 16:25) (91% - 96%)  Height (cm): 165.1 (09-07 @ 22:14)  Weight (kg): 71.4 (09-07 @ 22:14)  BMI (kg/m2): 26.2 (09-07 @ 22:14)    PHYSICAL EXAM:  GENERAL: NAD, laying supine  CHEST/LUNG: Clear to auscultation bilaterally; No wheeze  HEART: AI murmur   ABDOMEN: Soft, Nontender, Nondistended; Bowel sounds present  PSYCH: AAOx3        LABS                          10.9   4.67  )-----------( 232      ( 08 Sep 2021 08:30 )             33.7     09-08    143  |  104  |  49<H>  ----------------------------<  172<H>  4.1   |  28  |  3.21<H>    Ca    9.2      08 Sep 2021 08:30  Mg     1.9     09-08    TPro  6.7  /  Alb  3.8  /  TBili  0.5  /  DBili  <0.2  /  AST  32  /  ALT  19  /  AlkPhos  153<H>  09-08        CAPILLARY BLOOD GLUCOSE      POCT Blood Glucose.: 90 mg/dL (08 Sep 2021 11:43)  POCT Blood Glucose.: 158 mg/dL (08 Sep 2021 08:09)  POCT Blood Glucose.: 52 mg/dL (08 Sep 2021 07:13)

## 2021-09-10 NOTE — CONSULT NOTE ADULT - ATTENDING COMMENTS
Pt seen on rounds this afternoon.  Spoke to him with his daughter translating over the phone (and also providing additional history).    76-yo man with Stage 4 CKD (?? closer to stage 5), type 2 DM, hypothyroidism, CAD, Severe AS--now admitted with CHF, scheduled to undergo aortic valvuloplasty tomorrow.  Was breathing comfortably at the time of our visit.  Lungs were clear.  Heart with grade 2-3/6 BETH at the LSB and base, without a palpable thrill.  Is on 15 units of TResiba at home, with an A1c level of 6%, but excessively tight overnight control--(AM glucoses in the  range).  He does not test post-prandially but has a Marta device.  (We could not interrogate it because the  was at home)  --Glucose was 52 this morning with only 8 units Lantus last night.  Will therefore D/C the basal insulin (which he probably no longer needs because of his significant CKD).  --Continue on low-dose sliding scale  --His TSH level is moderately elevated at 9 uU/ml.  He appears to be taking the levothyroxine consistently and correctly.  The mild hypothyroidism does not pose a risk for his procedure tomorrow.  Will repeat the TFTs tomorrow and defer increasing the levothyroxine until after the procedure
See the Fellow's note written above, for details. I reviewed the fellow documentation.  I have personally seen and examined this patient today. I have reviewed vitals, labs, medications, and imaging. I agree with the fellow's findings and plans as written above with the following additions/amendments:    Patient seen and examined at bedside. Discussed at length with patient and daughter over phone at bedside risk of procedure if done with 100cc of contrast, acceptable risk given need for intervention and willing to do dialysis afterwards, as patient understands it has been progressing recently prior to admission as well    .  VITAL SIGNS:  T(F): 99.4 (09-08-21 @ 04:40), Max: 99.4 (09-08-21 @ 04:40)  HR: 65 (09-08-21 @ 16:25) (52 - 69)  BP: 144/63 (09-08-21 @ 16:25) (125/55 - 194/76)  BP(mean): 91 (09-08-21 @ 16:25) (79 - 109)  RR: 15 (09-08-21 @ 16:25) (15 - 18)  SpO2: 96% (09-08-21 @ 16:25) (91% - 96%)    PHYSICAL EXAM:  Constitutional: Resting comfortably in bed; NAD  HEENT:  EOMI, anicteric sclera; MMM  Respiratory: CTA B/L; no W/R/R, no accessory muscle use  Cardiac: +S1/S2; RRR; no M/R/G  Gastrointestinal: soft, NT/ND; no rebound or guarding; +BS  Extremities: WWP, no clubbing or cyanosis; no peripheral edema  Dermatologic: skin warm, dry and intact; no rashes, wounds, or scars      VERONICA on CKD - likely volume related, vs progression of disease  Assuming 100cc contrast load (lower than median 150cc), Cain risk of VERONICA 57% and needing dialysis of 12.6%. Would hydrate as above to mitigate risk as much as possible
Pt seen on rounds this afternoon s/p aortic valvuloplasty.  Was awake, resting comfortably in bed.  Lungs were clear.  Still with 2/6 BETH at the LSB and aortic area.  No AI was noted  Glucoses were > 200 at 5 PM and 10 PM yesterday.  The elevations at 5 PM was apparently due to a late lunch.  The explanation for the elevated bedtime reading is unclear  He then decreased to 156/143 today.  Will continue on sliding scale only for another day.  He may need to restart basal insulin, but this would presumably be due to the effects of perioperative stress--he did not seem to need basal insulin pre-op, and the basal insulin was excessive at home.  There is still a question as to whether he needs pre-meal insulin or an oral secretagogue.  His A1c level would suggest that he did not have any post-prandial hyperglycemia at home even though he was taking only basal insuln.

## 2021-09-10 NOTE — PROGRESS NOTE ADULT - SUBJECTIVE AND OBJECTIVE BOX
Operation / Date: 9/10/21 BAV    SUBJECTIVE ASSESSMENT:  Patient is post procedure, no specific c/o at this time.  Denies CP, SOB, dizziness, N/V/D, fever/chills or cough, pain at groin sites.     Vital Signs Last 24 Hrs  T(C): 36.7 (10 Sep 2021 12:15), Max: 37.2 (09 Sep 2021 18:38)  T(F): 98.1 (10 Sep 2021 12:15), Max: 98.9 (09 Sep 2021 18:38)  HR: 65 (10 Sep 2021 13:00) (57 - 67)  BP: 136/63 (10 Sep 2021 08:20) (136/63 - 159/67)  BP(mean): 91 (10 Sep 2021 08:20) (84 - 101)  RR: 24 (10 Sep 2021 13:00) (18 - 24)  SpO2: 95% (10 Sep 2021 13:00) (92% - 97%)  I&O's Detail    09 Sep 2021 07:01  -  10 Sep 2021 07:00  --------------------------------------------------------  IN:    sodium chloride 0.9%: 150 mL    sodium chloride 0.9%: 450 mL  Total IN: 600 mL    OUT:    Voided (mL): 950 mL  Total OUT: 950 mL    Total NET: -350 mL      PHYSICAL EXAM:    GEN: NAD, looks comfortable  Psych: Mood appropriate  Neuro: A&Ox3.  No focal deficits.  Moving all extremities.   HEENT: No obvious abnormalities  CV: S1S2, regular, no murmurs appreciated.  No carotid bruits.  No JVD  Lungs: Clear B/L.  No wheezing, rales or rhonchi  ABD: Soft, non-tender, non-distended.  +Bowel sounds  EXT: Warm and well perfused.  No peripheral edema noted  Musculoskeletal: Moving all extremities with normal ROM, no joint swelling  PV: Pedal pulses palpable  Incision Sites: Groin sites stable, no sheaths or sutures in place.      LABS:                        8.6    5.31  )-----------( 178      ( 10 Sep 2021 12:36 )             27.0       COUMADIN:  Yes/No. REASON: .    PT/INR - ( 10 Sep 2021 12:36 )   PT: 13.1 sec;   INR: 1.10          PTT - ( 10 Sep 2021 12:36 )  PTT:31.9 sec    09-10    144  |  107  |  56<H>  ----------------------------<  155<H>  4.3   |  25  |  3.16<H>    Ca    9.1      10 Sep 2021 12:36  Phos  3.5       Mg     1.9     09-10    TPro  6.3  /  Alb  3.7  /  TBili  0.4  /  DBili  x   /  AST  32  /  ALT  21  /  AlkPhos  145<H>  09-10      Urinalysis Basic - ( 08 Sep 2021 18:22 )    Color: Yellow / Appearance: Clear / S.020 / pH: x  Gluc: x / Ketone: NEGATIVE  / Bili: Negative / Urobili: 0.2 E.U./dL   Blood: x / Protein: 100 mg/dL / Nitrite: NEGATIVE   Leuk Esterase: NEGATIVE / RBC: x / WBC 5-10 /HPF   Sq Epi: x / Non Sq Epi: 0-5 /HPF / Bacteria: Present /HPF        MEDICATIONS  (STANDING):  amLODIPine   Tablet 10 milliGRAM(s) Oral daily  aspirin enteric coated 81 milliGRAM(s) Oral daily  atorvastatin 40 milliGRAM(s) Oral at bedtime  chlorhexidine 0.12% Liquid 5 milliLiter(s) Oral Mucosa two times a day  dextrose 40% Gel 15 Gram(s) Oral once  dextrose 5%. 1000 milliLiter(s) (50 mL/Hr) IV Continuous <Continuous>  dextrose 5%. 1000 milliLiter(s) (100 mL/Hr) IV Continuous <Continuous>  dextrose 50% Injectable 25 Gram(s) IV Push once  dextrose 50% Injectable 12.5 Gram(s) IV Push once  dextrose 50% Injectable 25 Gram(s) IV Push once  donepezil 10 milliGRAM(s) Oral at bedtime  furosemide    Tablet 40 milliGRAM(s) Oral daily  glucagon  Injectable 1 milliGRAM(s) IntraMuscular once  heparin   Injectable 5000 Unit(s) SubCutaneous every 8 hours  insulin lispro (ADMELOG) corrective regimen sliding scale   SubCutaneous Before meals and at bedtime  levothyroxine 25 MICROGram(s) Oral daily  metoprolol tartrate 50 milliGRAM(s) Oral every 6 hours  pantoprazole    Tablet 40 milliGRAM(s) Oral before breakfast  sodium chloride 0.9% lock flush 3 milliLiter(s) IV Push every 8 hours  sodium chloride 0.9%. 1000 milliLiter(s) (50 mL/Hr) IV Continuous <Continuous>  sodium chloride 0.9%. 1000 milliLiter(s) (10 mL/Hr) IV Continuous <Continuous>    MEDICATIONS  (PRN):        RADIOLOGY & ADDITIONAL TESTS:    Xray post BAV, looks congested B/L with some pleural effusions.  Report pending

## 2021-09-11 ENCOUNTER — TRANSCRIPTION ENCOUNTER (OUTPATIENT)
Age: 76
End: 2021-09-11

## 2021-09-11 VITALS — TEMPERATURE: 99 F

## 2021-09-11 LAB
ANION GAP SERPL CALC-SCNC: 11 MMOL/L — SIGNIFICANT CHANGE UP (ref 5–17)
APTT BLD: 34.8 SEC — SIGNIFICANT CHANGE UP (ref 27.5–35.5)
BASOPHILS # BLD AUTO: 0.02 K/UL — SIGNIFICANT CHANGE UP (ref 0–0.2)
BASOPHILS NFR BLD AUTO: 0.4 % — SIGNIFICANT CHANGE UP (ref 0–2)
BUN SERPL-MCNC: 48 MG/DL — HIGH (ref 7–23)
CALCIUM SERPL-MCNC: 8.8 MG/DL — SIGNIFICANT CHANGE UP (ref 8.4–10.5)
CHLORIDE SERPL-SCNC: 107 MMOL/L — SIGNIFICANT CHANGE UP (ref 96–108)
CO2 SERPL-SCNC: 26 MMOL/L — SIGNIFICANT CHANGE UP (ref 22–31)
CREAT SERPL-MCNC: 2.86 MG/DL — HIGH (ref 0.5–1.3)
EOSINOPHIL # BLD AUTO: 0.32 K/UL — SIGNIFICANT CHANGE UP (ref 0–0.5)
EOSINOPHIL NFR BLD AUTO: 6.7 % — HIGH (ref 0–6)
GLUCOSE BLDC GLUCOMTR-MCNC: 129 MG/DL — HIGH (ref 70–99)
GLUCOSE BLDC GLUCOMTR-MCNC: 195 MG/DL — HIGH (ref 70–99)
GLUCOSE SERPL-MCNC: 113 MG/DL — HIGH (ref 70–99)
HCT VFR BLD CALC: 26.1 % — LOW (ref 39–50)
HGB BLD-MCNC: 8.5 G/DL — LOW (ref 13–17)
IMM GRANULOCYTES NFR BLD AUTO: 0.4 % — SIGNIFICANT CHANGE UP (ref 0–1.5)
INR BLD: 1.11 — SIGNIFICANT CHANGE UP (ref 0.88–1.16)
LYMPHOCYTES # BLD AUTO: 0.62 K/UL — LOW (ref 1–3.3)
LYMPHOCYTES # BLD AUTO: 12.9 % — LOW (ref 13–44)
MAGNESIUM SERPL-MCNC: 1.8 MG/DL — SIGNIFICANT CHANGE UP (ref 1.6–2.6)
MCHC RBC-ENTMCNC: 31 PG — SIGNIFICANT CHANGE UP (ref 27–34)
MCHC RBC-ENTMCNC: 32.6 GM/DL — SIGNIFICANT CHANGE UP (ref 32–36)
MCV RBC AUTO: 95.3 FL — SIGNIFICANT CHANGE UP (ref 80–100)
MONOCYTES # BLD AUTO: 0.55 K/UL — SIGNIFICANT CHANGE UP (ref 0–0.9)
MONOCYTES NFR BLD AUTO: 11.5 % — SIGNIFICANT CHANGE UP (ref 2–14)
NEUTROPHILS # BLD AUTO: 3.26 K/UL — SIGNIFICANT CHANGE UP (ref 1.8–7.4)
NEUTROPHILS NFR BLD AUTO: 68.1 % — SIGNIFICANT CHANGE UP (ref 43–77)
NRBC # BLD: 0 /100 WBCS — SIGNIFICANT CHANGE UP (ref 0–0)
PLATELET # BLD AUTO: 171 K/UL — SIGNIFICANT CHANGE UP (ref 150–400)
POTASSIUM SERPL-MCNC: 3.8 MMOL/L — SIGNIFICANT CHANGE UP (ref 3.5–5.3)
POTASSIUM SERPL-SCNC: 3.8 MMOL/L — SIGNIFICANT CHANGE UP (ref 3.5–5.3)
PROTHROM AB SERPL-ACNC: 13.2 SEC — SIGNIFICANT CHANGE UP (ref 10.6–13.6)
RBC # BLD: 2.74 M/UL — LOW (ref 4.2–5.8)
RBC # FLD: 13.3 % — SIGNIFICANT CHANGE UP (ref 10.3–14.5)
SODIUM SERPL-SCNC: 144 MMOL/L — SIGNIFICANT CHANGE UP (ref 135–145)
WBC # BLD: 4.79 K/UL — SIGNIFICANT CHANGE UP (ref 3.8–10.5)
WBC # FLD AUTO: 4.79 K/UL — SIGNIFICANT CHANGE UP (ref 3.8–10.5)

## 2021-09-11 PROCEDURE — 99239 HOSP IP/OBS DSCHRG MGMT >30: CPT | Mod: 57

## 2021-09-11 PROCEDURE — 93308 TTE F-UP OR LMTD: CPT | Mod: 26

## 2021-09-11 PROCEDURE — 71045 X-RAY EXAM CHEST 1 VIEW: CPT | Mod: 26

## 2021-09-11 RX ORDER — PANTOPRAZOLE SODIUM 20 MG/1
1 TABLET, DELAYED RELEASE ORAL
Qty: 30 | Refills: 0
Start: 2021-09-11 | End: 2021-10-10

## 2021-09-11 RX ORDER — AMLODIPINE BESYLATE 2.5 MG/1
1 TABLET ORAL
Qty: 30 | Refills: 0
Start: 2021-09-11 | End: 2021-10-10

## 2021-09-11 RX ORDER — FUROSEMIDE 40 MG
1 TABLET ORAL
Qty: 30 | Refills: 0
Start: 2021-09-11 | End: 2021-10-10

## 2021-09-11 RX ORDER — ATORVASTATIN CALCIUM 80 MG/1
1 TABLET, FILM COATED ORAL
Qty: 0 | Refills: 0 | DISCHARGE

## 2021-09-11 RX ORDER — FUROSEMIDE 40 MG
1 TABLET ORAL
Qty: 0 | Refills: 0 | DISCHARGE

## 2021-09-11 RX ORDER — ATORVASTATIN CALCIUM 80 MG/1
1 TABLET, FILM COATED ORAL
Qty: 30 | Refills: 0
Start: 2021-09-11 | End: 2021-10-10

## 2021-09-11 RX ORDER — HYDRALAZINE HCL 50 MG
1.5 TABLET ORAL
Qty: 0 | Refills: 0 | DISCHARGE

## 2021-09-11 RX ORDER — METOPROLOL TARTRATE 50 MG
4 TABLET ORAL
Qty: 0 | Refills: 0 | DISCHARGE

## 2021-09-11 RX ORDER — ASPIRIN/CALCIUM CARB/MAGNESIUM 324 MG
1 TABLET ORAL
Qty: 30 | Refills: 0
Start: 2021-09-11 | End: 2021-10-10

## 2021-09-11 RX ORDER — AMLODIPINE BESYLATE 2.5 MG/1
1 TABLET ORAL
Qty: 0 | Refills: 0 | DISCHARGE

## 2021-09-11 RX ORDER — METOPROLOL TARTRATE 50 MG
2 TABLET ORAL
Qty: 120 | Refills: 0
Start: 2021-09-11 | End: 2021-10-10

## 2021-09-11 RX ORDER — METOPROLOL TARTRATE 50 MG
1 TABLET ORAL
Qty: 0 | Refills: 0 | DISCHARGE
Start: 2021-09-11 | End: 2021-10-10

## 2021-09-11 RX ORDER — INSULIN DEGLUDEC 100 U/ML
15 INJECTION, SOLUTION SUBCUTANEOUS
Qty: 0 | Refills: 0 | DISCHARGE

## 2021-09-11 RX ORDER — LEVOTHYROXINE SODIUM 125 MCG
1 TABLET ORAL
Qty: 0 | Refills: 0 | DISCHARGE

## 2021-09-11 RX ORDER — ASPIRIN/CALCIUM CARB/MAGNESIUM 324 MG
0 TABLET ORAL
Qty: 0 | Refills: 0 | DISCHARGE

## 2021-09-11 RX ORDER — LEVOTHYROXINE SODIUM 125 MCG
1 TABLET ORAL
Qty: 30 | Refills: 0
Start: 2021-09-11 | End: 2021-10-10

## 2021-09-11 RX ORDER — DONEPEZIL HYDROCHLORIDE 10 MG/1
1 TABLET, FILM COATED ORAL
Qty: 30 | Refills: 0
Start: 2021-09-11 | End: 2021-10-10

## 2021-09-11 RX ORDER — MAGNESIUM OXIDE 400 MG ORAL TABLET 241.3 MG
800 TABLET ORAL ONCE
Refills: 0 | Status: COMPLETED | OUTPATIENT
Start: 2021-09-11 | End: 2021-09-11

## 2021-09-11 RX ADMIN — SODIUM CHLORIDE 3 MILLILITER(S): 9 INJECTION INTRAMUSCULAR; INTRAVENOUS; SUBCUTANEOUS at 05:43

## 2021-09-11 RX ADMIN — HEPARIN SODIUM 5000 UNIT(S): 5000 INJECTION INTRAVENOUS; SUBCUTANEOUS at 05:35

## 2021-09-11 RX ADMIN — PANTOPRAZOLE SODIUM 40 MILLIGRAM(S): 20 TABLET, DELAYED RELEASE ORAL at 07:06

## 2021-09-11 RX ADMIN — CHLORHEXIDINE GLUCONATE 5 MILLILITER(S): 213 SOLUTION TOPICAL at 05:33

## 2021-09-11 RX ADMIN — Medication 40 MILLIGRAM(S): at 05:34

## 2021-09-11 RX ADMIN — Medication 100 MILLIGRAM(S): at 00:01

## 2021-09-11 RX ADMIN — Medication 25 MICROGRAM(S): at 05:34

## 2021-09-11 RX ADMIN — Medication 2: at 11:32

## 2021-09-11 RX ADMIN — Medication 50 MILLIGRAM(S): at 11:32

## 2021-09-11 RX ADMIN — Medication 81 MILLIGRAM(S): at 11:32

## 2021-09-11 RX ADMIN — AMLODIPINE BESYLATE 10 MILLIGRAM(S): 2.5 TABLET ORAL at 05:35

## 2021-09-11 RX ADMIN — MAGNESIUM OXIDE 400 MG ORAL TABLET 800 MILLIGRAM(S): 241.3 TABLET ORAL at 08:12

## 2021-09-11 NOTE — DISCHARGE NOTE PROVIDER - CARE PROVIDERS DIRECT ADDRESSES
,DirectAddress_Unknown,DirectAddress_Unknown,[email or web address]

## 2021-09-11 NOTE — DISCHARGE NOTE PROVIDER - CARE PROVIDER_API CALL
[facility]  [address] [place] [address]  Phone: [phone]  Fax: [phone]  Follow Up Time: 1 week    Scott Cole  [facility] [place] [address]  Phone: [phone]  Fax: (   )    -  Scheduled Appointment: 09/20/2021    Zuleyma Navarro)  Internal Medicine; Nephrology  [address] [place] [address]  Phone: [phone]  Fax: [phone]  Follow Up Time: 2 weeks

## 2021-09-11 NOTE — PROGRESS NOTE ADULT - SUBJECTIVE AND OBJECTIVE BOX
Patient discussed on morning rounds with Dr. Cole     Operation / Date: 9/10/21 BAV    Surgeon: Kelsey    Referring Physician: Tiana    SUBJECTIVE ASSESSMENT:  76y Male seen and examined at bedside.  Patient with no complaints.  Denies chest pain, shortness of breath, nausea, vomiting.    Hospital Course:  75 y/o Lithuanian/English speaking male with PMHx HTN, HLD, IDDM, CKD (Stage IV, baseline Cr 2.5-3), carotid artery stenosis s/p carotid stents, PAD, anemia, hypothyroid, CAD, chronic dCHF, and severe AS who is transferred from [facility] for further evaluation of his valvular disease.  Patient had a recent hospitalization in July for an acute on chronic heart failure exacerbation.  At that time he underwent cardiac cath and echo cardiogram and he was diagnosed with severe AS.  He had cardiac cath and structural CT scans for TAVR work up after which he developed VERONICA and his Cr bumped to 8 but never required RRT.  He eventually was discharged home and returns to hospital a couple of days ago with fluid overload.  He was admitted for acute on chronic dCHF exacerbation, diuresed with IV lasix and symptoms and swelling improved.  He is now s/p BAV on 9/10/21.  Procedure went well.  Post operative course was uncomplicated.  Today, POD 1 Patient ambulating independently with room air, tolerating diet, pain controlled, urinating.  Creatinine improved to 2.56Per Dr. Cole, Patient for discharge home.     Vital Signs Last 24 Hrs  T(C): 37.3 (11 Sep 2021 09:00), Max: 37.3 (11 Sep 2021 09:00)  T(F): 99.1 (11 Sep 2021 09:00), Max: 99.1 (11 Sep 2021 09:00)  HR: 72 (11 Sep 2021 11:00) (53 - 73)  BP: --  BP(mean): --  RR: 24 (11 Sep 2021 11:00) (11 - 32)  SpO2: 94% (11 Sep 2021 11:00) (90% - 98%)  I&O's Detail    10 Sep 2021 07:01  -  11 Sep 2021 07:00  --------------------------------------------------------  IN:    IV PiggyBack: 50 mL    Oral Fluid: 400 mL    sodium chloride 0.9%: 110 mL  Total IN: 560 mL    OUT:    Voided (mL): 800 mL  Total OUT: 800 mL    Total NET: -240 mL      11 Sep 2021 07:01  -  11 Sep 2021 12:01  --------------------------------------------------------  IN:  Total IN: 0 mL    OUT:    Voided (mL): 300 mL  Total OUT: 300 mL    Total NET: -300 mL            PHYSICAL EXAM:    GEN: NAD, looks comfortable  Psych: Mood appropriate  Neuro: A&Ox3.  No focal deficits.  Moving all extremities.   HEENT: No obvious abnormalities  CV: S1S2, regular, no murmurs appreciated.  No carotid bruits.  No JVD  Lungs: Clear B/L.  No wheezing, rales or rhonchi  ABD: Soft, non-tender, non-distended.  +Bowel sounds  EXT: Warm and well perfused.  No peripheral edema noted  Musculoskeletal: Moving all extremities with normal ROM, no joint swelling  PV: Pedal pulses palpable  Incision Sites: Groin sites stable, no sheaths or sutures in place.      LABS:                        8.5    4.79  )-----------( 171      ( 11 Sep 2021 06:29 )             26.1       COUMADIN:  No.        DOSE:                  INDICATION:                GOAL INR:    PT/INR - ( 11 Sep 2021 06:29 )   PT: 13.2 sec;   INR: 1.11          PTT - ( 11 Sep 2021 06:29 )  PTT:34.8 sec    09-11    144  |  107  |  48<H>  ----------------------------<  113<H>  3.8   |  26  |  2.86<H>    Ca    8.8      11 Sep 2021 06:29  Mg     1.8     09-11    TPro  6.3  /  Alb  3.7  /  TBili  0.4  /  DBili  x   /  AST  32  /  ALT  21  /  AlkPhos  145<H>  09-10          MEDICATIONS  (STANDING):  amLODIPine   Tablet 10 milliGRAM(s) Oral daily  aspirin enteric coated 81 milliGRAM(s) Oral daily  atorvastatin 40 milliGRAM(s) Oral at bedtime  chlorhexidine 0.12% Liquid 5 milliLiter(s) Oral Mucosa two times a day  dextrose 40% Gel 15 Gram(s) Oral once  dextrose 5%. 1000 milliLiter(s) (50 mL/Hr) IV Continuous <Continuous>  dextrose 5%. 1000 milliLiter(s) (100 mL/Hr) IV Continuous <Continuous>  dextrose 50% Injectable 25 Gram(s) IV Push once  dextrose 50% Injectable 12.5 Gram(s) IV Push once  dextrose 50% Injectable 25 Gram(s) IV Push once  donepezil 10 milliGRAM(s) Oral at bedtime  furosemide    Tablet 40 milliGRAM(s) Oral daily  glucagon  Injectable 1 milliGRAM(s) IntraMuscular once  heparin   Injectable 5000 Unit(s) SubCutaneous every 8 hours  influenza  Vaccine (HIGH DOSE) 0.7 milliLiter(s) IntraMuscular once  insulin lispro (ADMELOG) corrective regimen sliding scale   SubCutaneous Before meals and at bedtime  levothyroxine 25 MICROGram(s) Oral daily  metoprolol tartrate 50 milliGRAM(s) Oral every 6 hours  pantoprazole    Tablet 40 milliGRAM(s) Oral before breakfast  sodium chloride 0.9% lock flush 3 milliLiter(s) IV Push every 8 hours  sodium chloride 0.9%. 1000 milliLiter(s) (50 mL/Hr) IV Continuous <Continuous>  sodium chloride 0.9%. 1000 milliLiter(s) (10 mL/Hr) IV Continuous <Continuous>      Discharge CXR:    Discharge ECHO:

## 2021-09-11 NOTE — DISCHARGE NOTE NURSING/CASE MANAGEMENT/SOCIAL WORK - PATIENT PORTAL LINK FT
You can access the FollowMyHealth Patient Portal offered by [facility] by registering at the following website: [email or web address]. By joining [facility]’s FollowMyHealth portal, you will also be able to view your health information using other applications (apps) compatible with our system.

## 2021-09-11 NOTE — PHYSICAL THERAPY INITIAL EVALUATION ADULT - PERTINENT HX OF CURRENT PROBLEM, REHAB EVAL
Pt is 76 year old male had cardiac cath and structural CT scans for TAVR work up after which he developed VERONICA and his Cr bumped to 8 but never required RRT.  He eventually was discharged home and returns to hospital a couple of days ago with fluid overload.  He was admitted for acute on chronic dCHF exacerbation, diuresed with IV lasix and symptoms and swelling improved.  He is now s/p BAV today.

## 2021-09-11 NOTE — DISCHARGE NOTE PROVIDER - PROVIDER TOKENS
PROVIDER:[TOKEN:[[id number]],FOLLOWUP:[1 week]],FREE:[LAST:[Kelsey],FIRST:[Scott],PHONE:[phone]],FAX:[(   )    -],ADDRESS:[[address] [place] [address]],SCHEDULEDAPPT:[09/20/2021]],PROVIDER:[TOKEN:[[id number]],FOLLOWUP:[2 weeks]]

## 2021-09-11 NOTE — PROGRESS NOTE ADULT - ASSESSMENT
77 y/o Wolof/English speaking male with PMHx HTN, HLD, IDDM, CKD (Stage IV, baseline Cr 2.5-3), carotid artery stenosis s/p carotid stents, PAD, anemia, hypothyroid, CAD, chronic dCHF, and severe AS who is transferred from [facility] for further evaluation of his valvular disease.  Patient had a recent hospitalization in July for an acute on chronic heart failure exacerbation.  At that time he underwent cardiac cath and echo cardiogram and he was diagnosed with severe AS.  He had cardiac cath and structural CT scans for TAVR work up after which he developed VERONICA and his Cr bumped to 8 but never required RRT.  He eventually was discharged home and returns to hospital a couple of days ago with fluid overload.  He was admitted for acute on chronic dCHF exacerbation, diuresed with IV lasix and symptoms and swelling improved.  He is now s/p BAV today.  Procedure went well.  Recovering in CCU.  Bring to 9th floor pending bed availability.      Plan:    Neuro:   Pain meds PRN  No focal deficits  Aricept, home medication    CV: CAD/CHF/HTN/HLD  HR/BP controlled.   Hold linda agents today. Start tomorrow.   ASA, statin, norvasc, lasix.  Echo tomorrow.  Likely home tomorrow.  ??F/U for continued TAVR work-up    Pulm:   O2 as needed.  Po2 is 71 on gas  CXR congested.  ??U/S chest later when he can sit up, or tomorrow.    Continue w/ Lasix. Strict I's and O's.     GI:  PO diet when he passes dysphagia screen.   Protonix    Endo:   Synthroid for thyroid dz.       Dispo: CCU/ICU monitoring.  
77 yo Icelandic/English speaking M with PMHx HTN, CKD (Stage IV, baseline Cr 2.5-3) and severe AS who is transferred from [facility] for further evaluation of his valvular disease. Nephrology consulted for CKD management.    Assessment/Plan:   #VERONICA on CKD,   Baseline creatinine of (2.5-3) Patient presenting to have valvular disease assessed, as per cardiology to go for procedure 9/10 which will require contrast load. His creatinine at the moment is 3.05, slightly elevated from his normal baseline. Per orders was started on NS at 50cc/hr for hydration prior to procedure.      Patient will benefit from Poseidon protocol (POSEIDON Trial: Novel Sliding Scale Hydration Effective for the Prevention of CI-VERONICA) as follows:  "For at least an hour before receiving cardiac catheterization, to give 0.9 percent saline at 3 ml/kg. The LVEDP was measured in all patients at the start of the procedure and prior to contrast administration. In the LVEDP group, the fluid rate was adjusted according to the LVEDP (5 ml/kg/hr for LVEDP <13 mmHg; 3 ml/kg/hr for 13-18 mmHg; 1.5 ml/kg/hr for >18 mmHg). The fluid rate was set at the start of the procedure (prior to contrast exposure) and continued up until four hours after the procedure."    Reference: https://www.acc.org/latest-in-cardiology/articles/2012/10/23/10/02/poseidon    Recommend:   -Can give 225 cc N/S for 1 hour prior to cardiac cath, pending LVEDP adjust fluid rate and continue for up to four hours after procedure.  -Assuming 100cc contrast load (lower than median 150cc), Cain risk of VERONICA 57% and needing dialysis of 12.6%. Would hydrate as above to mitigate risk as much as possible.  -daily BMP  -urine lytes   -renal diet   -Obtain collateral from OP nephrologist      Eliazar Zhao D.O.  PGY 4 - Nephrology Fellow  [phone]      
7 yo Ukrainian/English speaking M with PMHx HTN, CKD (Stage IV, baseline Cr 2.5-3) and severe AS who is transferred from [facility] for further evaluation of his valvular disease. Nephrology consulted for CKD management.    Assessment/Plan:   #VERONICA on CKD,   Baseline creatinine of (2.5-3) Patient presenting to have valvular disease assessed,s/p procedure with cardiology. Was given prehydration prior to procedure and post procedure. Cr today is 2.86. Responded well to hydration therapy.  -Continue to monitor Cr  -Expect further improvement with time and Po intake  -renal diet   -Obtain collateral from OP nephrologist      Eliazar Zhao D.O.  PGY 4 - Nephrology Fellow  [phone]    
77 y/o Syriac/English speaking male with PMHx HTN, HLD, IDDM, CKD (Stage IV, baseline Cr 2.5-3), carotid artery stenosis s/p carotid stents, PAD, anemia, hypothyroid, CAD, chronic dCHF, and severe AS who is transferred from [facility] for further evaluation of his valvular disease.  Patient had a recent hospitalization in July for an acute on chronic heart failure exacerbation.  At that time he underwent cardiac cath and echo cardiogram and he was diagnosed with severe AS.  He had cardiac cath and structural CT scans for TAVR work up after which he developed VERONICA and his Cr bumped to 8 but never required RRT.  He eventually was discharged home and returns to hospital a couple of days ago with fluid overload.  He was admitted for acute on chronic dCHF exacerbation, diuresed with IV lasix and symptoms and swelling improved.  He is now s/p BAV on 9/10/21.  Procedure went well.  Post operative course was uncomplicated.  Today, POD 1 Patient ambulating independently with room air, tolerating diet, pain controlled, urinating.  Creatinine improved to 2.56    Per Dr. Cole, Patient for discharge home. 
A/P:  76 year old male with PMHx of HTN, HLD, IDDM, CKD (Stage IV baseline Cr 2.5-3), carotid artery stenosis s/p stenting, PAD, Anemia, Hypothyroid, CAD, Chronic diastolic CHF and known aortic stenosis with recent hospitalization in July for acute on chronic CHF exacerbation presented to [facility] complaining of worsening fluid overload. He was admitted for acute on chronic CHF exacerbation, diuresed with IV lasix with improvement. He underwent cardiac cath in July 2021 confirming severe AS. He is now transferred to [facility] under the care of  Dr. Cole for further workup.  Planned for BAV tomorrow.       Neurovascular: Stable,   - Continue donepezil 10mg qhs     Cardiovascular: Hemodynamically stable. HR controlled.  - Aortic stenosis plan for BAV tomorrow, preoperative workup pending including CBC, CMP, Coags, Lipid Panel, TSH, Hemoglobin A1c, Pro-BNP, Cardiac Enzymes, Type & Screen x 2,   UA, Carotid US, TTE, CXR Pa/Lat, EKG, Bedside PFTS   - Structural CT scan completed July 2021  - Continue asa 81mg, atorvastatin 40mg qhs, metoprolol 50mg Q6   - HTN continue amlodipine 10mg   - Acute on chronic CHF exacerbation, BNP 1599. Continue diuresis with lasix 40mg daily     Respiratory: 02 Sat = 94% on RA.  - CXR stable with bilateral congestion. Continue diuresis   - Encourage C+DB and Use of IS 10x / hr while awake.    GI: Stable.  - NPO after midnight for procedure tomorrow   - Continue protonix 40mg for GI ppx     Renal / : CKD Stage IV   - Baseline Cr 2.5 - 3, Cr 3.5 on admission now down to 3.2  - Renal consulted for perioperative recs   - gentle IV hydration this evening for contrast tomorrow   - Monitor renal function.  - Monitor I/O's.    Endocrine:  Hgba1c 6.1, TSH 9   - Follow up thyroid panel  - Hypothyroid, continue synthroid 25mcg. Dose may need to be adjusted pending thyroid panel   - Continue insulin sliding scale   - Monitor fingersticks     Prophylaxis:  - DVT prophylaxis with 5000 SubQ Heparin q8h.  - SCD's    Disposition: Preop BAV tomorrow

## 2021-09-11 NOTE — DISCHARGE NOTE PROVIDER - HOSPITAL COURSE
75 y/o Lithuanian/English speaking male with PMHx HTN, HLD, IDDM, CKD (Stage IV, baseline Cr 2.5-3), carotid artery stenosis s/p carotid stents, PAD, anemia, hypothyroid, CAD, chronic dCHF, and severe AS who is transferred from [facility] for further evaluation of his valvular disease.  Patient had a recent hospitalization in July for an acute on chronic heart failure exacerbation.  At that time he underwent cardiac cath and echo cardiogram and he was diagnosed with severe AS.  He had cardiac cath and structural CT scans for TAVR work up after which he developed VERONICA and his Cr bumped to 8 but never required RRT.  He eventually was discharged home and returns to hospital a couple of days ago with fluid overload.  He was admitted for acute on chronic dCHF exacerbation, diuresed with IV lasix and symptoms and swelling improved.  He is now s/p BAV on 9/10/21.  Procedure went well.  Post operative course was uncomplicated.  Today, POD 1 Patient ambulating independently with room air, tolerating diet, pain controlled, urinating.  Creatinine improved to 2.56Per Dr. Cole, Patient for discharge home.     Over 35 minutes was spent with the patient reviewing the discharge material including medications, follow up appointments, recovery, concerning symptoms, and how to contact their health care providers if they have questions 77 y/o Greenlandic/English speaking male with PMHx HTN, HLD, IDDM, CKD (Stage IV, baseline Cr 2.5-3), carotid artery stenosis s/p carotid stents, PAD, anemia, hypothyroid, CAD, chronic dCHF, and severe AS who is transferred from [facility] for further evaluation of his valvular disease.  Patient had a recent hospitalization in July for an acute on chronic heart failure exacerbation.  At that time he underwent cardiac cath and echo cardiogram and he was diagnosed with severe AS.  He had cardiac cath and structural CT scans for TAVR work up after which he developed VERONICA and his Cr bumped to 8 but never required RRT.  He eventually was discharged home and returns to hospital a couple of days ago with fluid overload.  He was admitted for acute on chronic dCHF exacerbation and VERONICA, diuresed with IV lasix and symptoms and swelling improved.  He is now s/p BAV on 9/10/21.  Procedure went well.  Post operative course was uncomplicated.  Today, POD 1 Patient ambulating independently with room air, tolerating diet, pain controlled, urinating.  Creatinine improved to 2.56Per Dr. Cole, Patient for discharge home.     Over 35 minutes was spent with the patient reviewing the discharge material including medications, follow up appointments, recovery, concerning symptoms, and how to contact their health care providers if they have questions

## 2021-09-11 NOTE — DISCHARGE NOTE PROVIDER - NSDCMRMEDTOKEN_GEN_ALL_CORE_FT
amLODIPine 10 mg oral tablet: 1 tab(s) orally once a day  aspirin 81 mg oral delayed release tablet: 1 tab(s) orally once a day  atorvastatin 40 mg oral tablet: 1 tab(s) orally once a day (at bedtime)  cholecalciferol 1000 intl units (25 mcg) oral tablet: 1 tab(s) orally once a week  donepezil 10 mg oral tablet: 1 tab(s) orally once a day (at bedtime)  dorzolamide 2% ophthalmic solution: to each affected eye once a day  furosemide 40 mg oral tablet: 1 tab(s) orally once a day  levothyroxine 25 mcg (0.025 mg) oral tablet: 1 tab(s) orally once a day  metoprolol tartrate 50 mg oral tablet: 2 tab(s) orally every 12 hours   pantoprazole 40 mg oral delayed release tablet: 1 tab(s) orally once a day (before a meal)  Tresiba 100 units/mL subcutaneous solution: 15 unit(s) subcutaneous once a day (at bedtime)   amLODIPine 10 mg oral tablet: 1 tab(s) orally once a day  aspirin 81 mg oral delayed release tablet: 1 tab(s) orally once a day  atorvastatin 40 mg oral tablet: 1 tab(s) orally once a day (at bedtime)  cholecalciferol 1000 intl units (25 mcg) oral tablet: 1 tab(s) orally once a week  donepezil 10 mg oral tablet: 1 tab(s) orally once a day (at bedtime)  dorzolamide 2% ophthalmic solution: to each affected eye once a day  furosemide 40 mg oral tablet: 1 tab(s) orally once a day  levothyroxine 25 mcg (0.025 mg) oral tablet: 1 tab(s) orally once a day  metoprolol tartrate 50 mg oral tablet: 2 tab(s) orally every 12 hours   pantoprazole 40 mg oral delayed release tablet: 1 tab(s) orally once a day (before a meal)

## 2021-09-11 NOTE — DISCHARGE NOTE PROVIDER - NSDCCPCAREPLAN_GEN_ALL_CORE_FT
PRINCIPAL DISCHARGE DIAGNOSIS  Diagnosis: Severe aortic stenosis  Assessment and Plan of Treatment:       SECONDARY DISCHARGE DIAGNOSES  Diagnosis: Hypertension  Assessment and Plan of Treatment:     Diagnosis: Hyperlipidemia  Assessment and Plan of Treatment:     Diagnosis: Insulin dependent type 2 diabetes mellitus  Assessment and Plan of Treatment:     Diagnosis: Stage 4 chronic kidney disease  Assessment and Plan of Treatment:     Diagnosis: Carotid artery stenosis  Assessment and Plan of Treatment:

## 2021-09-11 NOTE — DISCHARGE NOTE PROVIDER - NSDCFUADDAPPT_GEN_ALL_CORE_FT
-Please follow up with Dr. Cole on 9/20/21.  The office is located at [address] [place] [address].    -Please follow up with Dr. Montiel in 1-2 weeks.     -Please follow up with your PCP regarding your diabetes in 1 week.    -Please follow up with Dr. Caldwell in 1-2 weeks regarding kidney function.

## 2021-09-11 NOTE — PROGRESS NOTE ADULT - SUBJECTIVE AND OBJECTIVE BOX
Patient is a 76y Male seen and evaluated at bedside doing well this morning with no acute complaints voiced. Asking when he will be able to go home.       Meds:    amLODIPine   Tablet 10 daily  aspirin enteric coated 81 daily  atorvastatin 40 at bedtime  chlorhexidine 0.12% Liquid 5 two times a day  dextrose 40% Gel 15 once  dextrose 5%. 1000 <Continuous>  dextrose 5%. 1000 <Continuous>  dextrose 50% Injectable 25 once  dextrose 50% Injectable 12.5 once  dextrose 50% Injectable 25 once  donepezil 10 at bedtime  furosemide    Tablet 40 daily  glucagon  Injectable 1 once  heparin   Injectable 5000 every 8 hours  influenza  Vaccine (HIGH DOSE) 0.7 once  insulin lispro (ADMELOG) corrective regimen sliding scale  Before meals and at bedtime  levothyroxine 25 daily  metoprolol tartrate 50 every 6 hours  pantoprazole    Tablet 40 before breakfast  sodium chloride 0.9% lock flush 3 every 8 hours  sodium chloride 0.9%. 1000 <Continuous>  sodium chloride 0.9%. 1000 <Continuous>      T(C): , Max: 37.3 (09-11-21 @ 09:00)  T(F): , Max: 99.1 (09-11-21 @ 09:00)  HR: 64 (09-11-21 @ 10:00)  BP: --  BP(mean): --  RR: 13 (09-11-21 @ 10:00)  SpO2: 94% (09-11-21 @ 10:00)  Wt(kg): --    09-10 @ 07:01  -  09-11 @ 07:00  --------------------------------------------------------  IN: 560 mL / OUT: 800 mL / NET: -240 mL          Review of Systems:  10 point ROS negative except as per HPI      PHYSICAL EXAM:  GENERAL: Alert, awake, oriented x3   HEENT: ENZO, EOMI  CHEST/LUNG: Bilateral clear breath sounds  HEART: Regular rate and rhythm, no murmur, no gallops, no rub   ABDOMEN: Soft, nontender, non distended  EXTREMITIES: no pedal edema, WWP  Neurology: AAOx3, no focal neurological deficit  SKIN: No rash or skin lesion       LABS:                        8.5    4.79  )-----------( 171      ( 11 Sep 2021 06:29 )             26.1     09-11    144  |  107  |  48<H>  ----------------------------<  113<H>  3.8   |  26  |  2.86<H>    Ca    8.8      11 Sep 2021 06:29  Mg     1.8     09-11    TPro  6.3  /  Alb  3.7  /  TBili  0.4  /  DBili  x   /  AST  32  /  ALT  21  /  AlkPhos  145<H>  09-10      PT/INR - ( 11 Sep 2021 06:29 )   PT: 13.2 sec;   INR: 1.11          PTT - ( 11 Sep 2021 06:29 )  PTT:34.8 sec          RADIOLOGY & ADDITIONAL STUDIES:

## 2021-09-11 NOTE — DISCHARGE NOTE PROVIDER - NSDCFUADDINST_GEN_ALL_CORE_FT
-Walk daily as tolerated and use your incentive spirometer every hour.    -No driving or strenuous activity/exercise for until cleared by your surgeon.    -Gently clean your incisions with anti-bacterial soap and water, pat dry.  You may leave them open to air.    -Call your doctor if you have shortness of breath, chest pain not relieved by pain medication, dizziness, fever >101.5, or increased redness or drainage from incisions.  -Walk daily as tolerated and use your incentive spirometer every hour.    -No driving or strenuous activity/exercise for until cleared by your surgeon.    -Please check your blood sugar 4 times a day (before breakfast, lunch, and dinner and prior to going to bed).  If blood sugar remains below 180 please follow up with your PCP or endocrinologist in 1 week.  If blood sugar is above 200 consistently please take Tresiba 5 units at bedtime only and follow up with your PCP or endocrinologist.    -Gently clean your incisions with anti-bacterial soap and water, pat dry.  You may leave them open to air.    -Call your doctor if you have shortness of breath, chest pain not relieved by pain medication, dizziness, fever >101.5, or increased redness or drainage from incisions.

## 2021-09-11 NOTE — DISCHARGE NOTE PROVIDER - NSDCACTIVITY_GEN_ALL_CORE
Do not drive or operate machinery/Stairs allowed/Walking - Indoors allowed/No heavy lifting/straining/Walking - Outdoors allowed/Follow Instructions Provided by your Surgical Team

## 2021-09-11 NOTE — PHYSICAL THERAPY INITIAL EVALUATION ADULT - ADDITIONAL COMMENTS
Pt states he lives in apartment building with wife and son 6 [address]. Pt was independent withADL's and amb PTA.

## 2021-09-20 ENCOUNTER — APPOINTMENT (OUTPATIENT)
Dept: HEART AND VASCULAR | Facility: CLINIC | Age: 76
End: 2021-09-20
Payer: MEDICARE

## 2021-09-20 VITALS
TEMPERATURE: 97.8 F | RESPIRATION RATE: 16 BRPM | HEIGHT: 65 IN | DIASTOLIC BLOOD PRESSURE: 60 MMHG | SYSTOLIC BLOOD PRESSURE: 100 MMHG | OXYGEN SATURATION: 98 % | WEIGHT: 154 LBS | BODY MASS INDEX: 25.66 KG/M2 | HEART RATE: 48 BPM

## 2021-09-20 PROCEDURE — 99214 OFFICE O/P EST MOD 30 MIN: CPT | Mod: 24

## 2021-09-20 RX ORDER — GLIPIZIDE 2.5 MG/1
2.5 TABLET, FILM COATED, EXTENDED RELEASE ORAL
Refills: 0 | Status: DISCONTINUED | COMMUNITY
End: 2021-09-20

## 2021-09-20 RX ORDER — HYDRALAZINE HYDROCHLORIDE 50 MG/1
50 TABLET ORAL
Refills: 0 | Status: DISCONTINUED | COMMUNITY
End: 2021-09-20

## 2021-09-20 RX ORDER — CLOPIDOGREL 75 MG/1
75 TABLET, FILM COATED ORAL
Refills: 0 | Status: DISCONTINUED | COMMUNITY
End: 2021-09-20

## 2021-09-22 DIAGNOSIS — Z79.82 LONG TERM (CURRENT) USE OF ASPIRIN: ICD-10-CM

## 2021-09-22 DIAGNOSIS — I50.32 CHRONIC DIASTOLIC (CONGESTIVE) HEART FAILURE: ICD-10-CM

## 2021-09-22 DIAGNOSIS — Z83.3 FAMILY HISTORY OF DIABETES MELLITUS: ICD-10-CM

## 2021-09-22 DIAGNOSIS — E78.5 HYPERLIPIDEMIA, UNSPECIFIED: ICD-10-CM

## 2021-09-22 DIAGNOSIS — E11.51 TYPE 2 DIABETES MELLITUS WITH DIABETIC PERIPHERAL ANGIOPATHY WITHOUT GANGRENE: ICD-10-CM

## 2021-09-22 DIAGNOSIS — I35.0 NONRHEUMATIC AORTIC (VALVE) STENOSIS: ICD-10-CM

## 2021-09-22 DIAGNOSIS — E11.22 TYPE 2 DIABETES MELLITUS WITH DIABETIC CHRONIC KIDNEY DISEASE: ICD-10-CM

## 2021-09-22 DIAGNOSIS — I25.10 ATHEROSCLEROTIC HEART DISEASE OF NATIVE CORONARY ARTERY WITHOUT ANGINA PECTORIS: ICD-10-CM

## 2021-09-22 DIAGNOSIS — Z79.4 LONG TERM (CURRENT) USE OF INSULIN: ICD-10-CM

## 2021-09-22 DIAGNOSIS — N17.9 ACUTE KIDNEY FAILURE, UNSPECIFIED: ICD-10-CM

## 2021-09-22 DIAGNOSIS — I13.0 HYPERTENSIVE HEART AND CHRONIC KIDNEY DISEASE WITH HEART FAILURE AND STAGE 1 THROUGH STAGE 4 CHRONIC KIDNEY DISEASE, OR UNSPECIFIED CHRONIC KIDNEY DISEASE: ICD-10-CM

## 2021-09-22 DIAGNOSIS — D50.9 IRON DEFICIENCY ANEMIA, UNSPECIFIED: ICD-10-CM

## 2021-09-22 DIAGNOSIS — E03.9 HYPOTHYROIDISM, UNSPECIFIED: ICD-10-CM

## 2021-09-22 DIAGNOSIS — E11.65 TYPE 2 DIABETES MELLITUS WITH HYPERGLYCEMIA: ICD-10-CM

## 2021-09-22 DIAGNOSIS — E11.649 TYPE 2 DIABETES MELLITUS WITH HYPOGLYCEMIA WITHOUT COMA: ICD-10-CM

## 2021-09-22 DIAGNOSIS — I27.20 PULMONARY HYPERTENSION, UNSPECIFIED: ICD-10-CM

## 2021-09-22 DIAGNOSIS — F03.90 UNSPECIFIED DEMENTIA, UNSPECIFIED SEVERITY, WITHOUT BEHAVIORAL DISTURBANCE, PSYCHOTIC DISTURBANCE, MOOD DISTURBANCE, AND ANXIETY: ICD-10-CM

## 2021-09-22 DIAGNOSIS — N18.4 CHRONIC KIDNEY DISEASE, STAGE 4 (SEVERE): ICD-10-CM

## 2021-09-25 NOTE — ASSESSMENT
[FreeTextEntry1] : Chronic diastolic heart failure- mildly overloaded, continue lasix 40mg daily, renal f/u\par Severe AS- mean gradient 40mmhg on cath s/p BAV. TAVR CT done\par CKD IV- repeat CMP today, renal f/u\par CAD- asymptomatic, medically manage \par We discussed timing of potential TAVR, and will monitor renal function over the next month and then re-assess.

## 2021-09-25 NOTE — PHYSICAL EXAM
[Well Developed] : well developed [No Acute Distress] : no acute distress [Well Nourished] : well nourished [Normal Conjunctiva] : normal conjunctiva [Normal Venous Pressure] : normal venous pressure [No Carotid Bruit] : no carotid bruit [No Rub] : no rub [No Gallop] : no gallop [Clear Lung Fields] : clear lung fields [Good Air Entry] : good air entry [No Respiratory Distress] : no respiratory distress  [Soft] : abdomen soft [Non Tender] : non-tender [No Masses/organomegaly] : no masses/organomegaly [Normal Bowel Sounds] : normal bowel sounds [Normal Gait] : normal gait [No Cyanosis] : no cyanosis [No Varicosities] : no varicosities [No Clubbing] : no clubbing [Edema ___] : edema [unfilled] [No Rash] : no rash [No Skin Lesions] : no skin lesions [Moves all extremities] : moves all extremities [No Focal Deficits] : no focal deficits [Normal Speech] : normal speech [Alert and Oriented] : alert and oriented [Normal memory] : normal memory [de-identified] : +BETH

## 2021-09-25 NOTE — HISTORY OF PRESENT ILLNESS
[FreeTextEntry1] : 77 y/o Japanese/English speaking male with PMHx HTN, HLD, IDDM, CKD (Stage IV, baseline Cr 2.5-3), carotid artery stenosis s/p carotid stents, PAD, anemia, hypothyroid, CAD, chronic dCHF, and severe AS who is s/p BAV with improved gradients (mean 41mmHg to 17mmHg). \par Pt was recently admitted for acute on chronic diastolic heart failure and VERONICA and had a successful BAV on 9/10/2021. Of note prior to hospitalization pt had TAVR scan and VERONICA to creatinine of 8. The creatinine improved, but they had difficulty diuresing him as an outpaitent. \par After the BAV creatinine improved to 2.5 and pt was discharged POD#1. He has been followed by renal (Dr. Gaffney) and had repeat creatinine last wk (results pending)\par Today pt reports stable NYHA II symptoms, denies CP/SOB. LE swelling mild

## 2021-09-25 NOTE — REVIEW OF SYSTEMS
[Lower Ext Edema] : lower extremity edema [Fever] : no fever [Blurry Vision] : no blurred vision [Chills] : no chills [Earache] : no earache [Sore Throat] : no sore throat [SOB] : no shortness of breath [Dyspnea on exertion] : not dyspnea during exertion [Chest Discomfort] : no chest discomfort [Cough] : no cough [Abdominal Pain] : no abdominal pain [Urinary Frequency] : no change in urinary frequency [Joint Pain] : no joint pain [Rash] : no rash [Skin Lesions] : no skin lesions [Confusion] : no confusion was observed [Under Stress] : not under stress [Easy Bleeding] : no tendency for easy bleeding

## 2021-10-04 ENCOUNTER — TRANSCRIPTION ENCOUNTER (OUTPATIENT)
Age: 76
End: 2021-10-04

## 2021-10-22 ENCOUNTER — APPOINTMENT (OUTPATIENT)
Dept: HEART AND VASCULAR | Facility: CLINIC | Age: 76
End: 2021-10-22

## 2021-10-22 PROCEDURE — 81001 URINALYSIS AUTO W/SCOPE: CPT

## 2021-10-22 PROCEDURE — 84100 ASSAY OF PHOSPHORUS: CPT

## 2021-10-22 PROCEDURE — 86901 BLOOD TYPING SEROLOGIC RH(D): CPT

## 2021-10-22 PROCEDURE — 80076 HEPATIC FUNCTION PANEL: CPT

## 2021-10-22 PROCEDURE — 84295 ASSAY OF SERUM SODIUM: CPT

## 2021-10-22 PROCEDURE — 82962 GLUCOSE BLOOD TEST: CPT

## 2021-10-22 PROCEDURE — 86850 RBC ANTIBODY SCREEN: CPT

## 2021-10-22 PROCEDURE — 82330 ASSAY OF CALCIUM: CPT

## 2021-10-22 PROCEDURE — 84436 ASSAY OF TOTAL THYROXINE: CPT

## 2021-10-22 PROCEDURE — 85027 COMPLETE CBC AUTOMATED: CPT

## 2021-10-22 PROCEDURE — 82550 ASSAY OF CK (CPK): CPT

## 2021-10-22 PROCEDURE — 36415 COLL VENOUS BLD VENIPUNCTURE: CPT

## 2021-10-22 PROCEDURE — 93880 EXTRACRANIAL BILAT STUDY: CPT

## 2021-10-22 PROCEDURE — 86769 SARS-COV-2 COVID-19 ANTIBODY: CPT

## 2021-10-22 PROCEDURE — U0005: CPT

## 2021-10-22 PROCEDURE — 84132 ASSAY OF SERUM POTASSIUM: CPT

## 2021-10-22 PROCEDURE — 82803 BLOOD GASES ANY COMBINATION: CPT

## 2021-10-22 PROCEDURE — 84480 ASSAY TRIIODOTHYRONINE (T3): CPT

## 2021-10-22 PROCEDURE — 93321 DOPPLER ECHO F-UP/LMTD STD: CPT

## 2021-10-22 PROCEDURE — 82553 CREATINE MB FRACTION: CPT

## 2021-10-22 PROCEDURE — 86923 COMPATIBILITY TEST ELECTRIC: CPT

## 2021-10-22 PROCEDURE — C1889: CPT

## 2021-10-22 PROCEDURE — 84439 ASSAY OF FREE THYROXINE: CPT

## 2021-10-22 PROCEDURE — 84443 ASSAY THYROID STIM HORMONE: CPT

## 2021-10-22 PROCEDURE — C1894: CPT

## 2021-10-22 PROCEDURE — 86803 HEPATITIS C AB TEST: CPT

## 2021-10-22 PROCEDURE — 85730 THROMBOPLASTIN TIME PARTIAL: CPT

## 2021-10-22 PROCEDURE — C1887: CPT

## 2021-10-22 PROCEDURE — 97116 GAIT TRAINING THERAPY: CPT

## 2021-10-22 PROCEDURE — 84484 ASSAY OF TROPONIN QUANT: CPT

## 2021-10-22 PROCEDURE — 85025 COMPLETE CBC W/AUTO DIFF WBC: CPT

## 2021-10-22 PROCEDURE — 83735 ASSAY OF MAGNESIUM: CPT

## 2021-10-22 PROCEDURE — 71045 X-RAY EXAM CHEST 1 VIEW: CPT

## 2021-10-22 PROCEDURE — 83036 HEMOGLOBIN GLYCOSYLATED A1C: CPT

## 2021-10-22 PROCEDURE — 80053 COMPREHEN METABOLIC PANEL: CPT

## 2021-10-22 PROCEDURE — 80061 LIPID PANEL: CPT

## 2021-10-22 PROCEDURE — 97162 PT EVAL MOD COMPLEX 30 MIN: CPT

## 2021-10-22 PROCEDURE — 94150 VITAL CAPACITY TEST: CPT

## 2021-10-22 PROCEDURE — U0003: CPT

## 2021-10-22 PROCEDURE — C1725: CPT

## 2021-10-22 PROCEDURE — 93306 TTE W/DOPPLER COMPLETE: CPT

## 2021-10-22 PROCEDURE — 80048 BASIC METABOLIC PNL TOTAL CA: CPT

## 2021-10-22 PROCEDURE — 83880 ASSAY OF NATRIURETIC PEPTIDE: CPT

## 2021-10-22 PROCEDURE — 85610 PROTHROMBIN TIME: CPT

## 2021-10-22 PROCEDURE — C1769: CPT

## 2021-10-22 PROCEDURE — 86900 BLOOD TYPING SEROLOGIC ABO: CPT

## 2021-12-17 ENCOUNTER — APPOINTMENT (OUTPATIENT)
Dept: VASCULAR SURGERY | Facility: CLINIC | Age: 76
End: 2021-12-17
Payer: MEDICARE

## 2021-12-17 PROCEDURE — 99213 OFFICE O/P EST LOW 20 MIN: CPT

## 2022-01-07 ENCOUNTER — HOSPITAL ENCOUNTER (INPATIENT)
Dept: HOSPITAL 74 - JER | Age: 77
LOS: 5 days | Discharge: HOME | DRG: 291 | End: 2022-01-12
Attending: GENERAL ACUTE CARE HOSPITAL | Admitting: HOSPITALIST
Payer: COMMERCIAL

## 2022-01-07 VITALS — BODY MASS INDEX: 27.6 KG/M2

## 2022-01-07 DIAGNOSIS — E78.5: ICD-10-CM

## 2022-01-07 DIAGNOSIS — N18.6: ICD-10-CM

## 2022-01-07 DIAGNOSIS — E11.51: ICD-10-CM

## 2022-01-07 DIAGNOSIS — I25.10: ICD-10-CM

## 2022-01-07 DIAGNOSIS — N17.9: ICD-10-CM

## 2022-01-07 DIAGNOSIS — E11.22: ICD-10-CM

## 2022-01-07 DIAGNOSIS — M10.9: ICD-10-CM

## 2022-01-07 DIAGNOSIS — I13.2: Primary | ICD-10-CM

## 2022-01-07 DIAGNOSIS — E03.9: ICD-10-CM

## 2022-01-07 DIAGNOSIS — I36.1: ICD-10-CM

## 2022-01-07 DIAGNOSIS — D53.9: ICD-10-CM

## 2022-01-07 DIAGNOSIS — I50.33: ICD-10-CM

## 2022-01-07 DIAGNOSIS — I35.0: ICD-10-CM

## 2022-01-07 LAB
ALBUMIN SERPL-MCNC: 4 G/DL (ref 3.4–5)
ALP SERPL-CCNC: 282 U/L (ref 45–117)
ALT SERPL-CCNC: 85 U/L (ref 13–61)
ANION GAP SERPL CALC-SCNC: 9 MMOL/L (ref 8–16)
APTT BLD: 31.6 SECONDS (ref 25.2–36.5)
AST SERPL-CCNC: 50 U/L (ref 15–37)
BASOPHILS # BLD: 1.2 % (ref 0–2)
BILIRUB SERPL-MCNC: 0.9 MG/DL (ref 0.2–1)
BNP SERPL-MCNC: 6599.1 PG/ML (ref 5–450)
BUN SERPL-MCNC: 133.4 MG/DL (ref 7–18)
CALCIUM SERPL-MCNC: 9.7 MG/DL (ref 8.5–10.1)
CHLORIDE SERPL-SCNC: 108 MMOL/L (ref 98–107)
CO2 SERPL-SCNC: 27 MMOL/L (ref 21–32)
CREAT SERPL-MCNC: 6 MG/DL (ref 0.55–1.3)
DEPRECATED RDW RBC AUTO: 15 % (ref 11.9–15.9)
EOSINOPHIL # BLD: 2.2 % (ref 0–4.5)
GLUCOSE SERPL-MCNC: 212 MG/DL (ref 74–106)
HCT VFR BLD CALC: 28 % (ref 35.4–49)
HGB BLD-MCNC: 9.3 GM/DL (ref 11.7–16.9)
INR BLD: 1.09 (ref 0.83–1.09)
LYMPHOCYTES # BLD: 6.5 % (ref 8–40)
MAGNESIUM SERPL-MCNC: 3 MG/DL (ref 1.8–2.4)
MCH RBC QN AUTO: 32.6 PG (ref 25.7–33.7)
MCHC RBC AUTO-ENTMCNC: 33.2 G/DL (ref 32–35.9)
MCV RBC: 98.1 FL (ref 80–96)
MONOCYTES # BLD AUTO: 6.8 % (ref 3.8–10.2)
NEUTROPHILS # BLD: 83.3 % (ref 42.8–82.8)
PLATELET # BLD AUTO: 178 10^3/UL (ref 134–434)
PMV BLD: 10.2 FL (ref 7.5–11.1)
PROT SERPL-MCNC: 7.5 G/DL (ref 6.4–8.2)
PT PNL PPP: 12.6 SEC (ref 9.7–13)
RBC # BLD AUTO: 2.85 M/MM3 (ref 4–5.6)
SODIUM SERPL-SCNC: 144 MMOL/L (ref 136–145)
WBC # BLD AUTO: 6.8 K/MM3 (ref 4–10)

## 2022-01-07 PROCEDURE — U0005 INFEC AGEN DETEC AMPLI PROBE: HCPCS

## 2022-01-07 PROCEDURE — C9803 HOPD COVID-19 SPEC COLLECT: HCPCS

## 2022-01-07 PROCEDURE — U0003 INFECTIOUS AGENT DETECTION BY NUCLEIC ACID (DNA OR RNA); SEVERE ACUTE RESPIRATORY SYNDROME CORONAVIRUS 2 (SARS-COV-2) (CORONAVIRUS DISEASE [COVID-19]), AMPLIFIED PROBE TECHNIQUE, MAKING USE OF HIGH THROUGHPUT TECHNOLOGIES AS DESCRIBED BY CMS-2020-01-R: HCPCS

## 2022-01-08 LAB
ALBUMIN SERPL-MCNC: 3.7 G/DL (ref 3.4–5)
ALP SERPL-CCNC: 254 U/L (ref 45–117)
ALT SERPL-CCNC: 68 U/L (ref 13–61)
ANION GAP SERPL CALC-SCNC: 11 MMOL/L (ref 8–16)
AST SERPL-CCNC: 35 U/L (ref 15–37)
BASOPHILS # BLD: 0.9 % (ref 0–2)
BILIRUB SERPL-MCNC: 0.6 MG/DL (ref 0.2–1)
BUN SERPL-MCNC: 131.8 MG/DL (ref 7–18)
CALCIUM SERPL-MCNC: 9.2 MG/DL (ref 8.5–10.1)
CHLORIDE SERPL-SCNC: 108 MMOL/L (ref 98–107)
CHOLEST SERPL-MCNC: 137 MG/DL (ref 50–200)
CO2 SERPL-SCNC: 26 MMOL/L (ref 21–32)
CREAT SERPL-MCNC: 5.8 MG/DL (ref 0.55–1.3)
DEPRECATED RDW RBC AUTO: 14.5 % (ref 11.9–15.9)
EOSINOPHIL # BLD: 2.2 % (ref 0–4.5)
GLUCOSE SERPL-MCNC: 192 MG/DL (ref 74–106)
HCT VFR BLD CALC: 26.2 % (ref 35.4–49)
HDLC SERPL-MCNC: 54 MG/DL (ref 40–60)
HGB BLD-MCNC: 8.4 GM/DL (ref 11.7–16.9)
IRON SERPL-MCNC: 27 UG/DL (ref 50–175)
LDLC SERPL CALC-MCNC: 64 MG/DL (ref 5–100)
LYMPHOCYTES # BLD: 9.8 % (ref 8–40)
MAGNESIUM SERPL-MCNC: 2.9 MG/DL (ref 1.8–2.4)
MCH RBC QN AUTO: 32.2 PG (ref 25.7–33.7)
MCHC RBC AUTO-ENTMCNC: 32.3 G/DL (ref 32–35.9)
MCV RBC: 99.6 FL (ref 80–96)
MONOCYTES # BLD AUTO: 8.3 % (ref 3.8–10.2)
NEUTROPHILS # BLD: 78.8 % (ref 42.8–82.8)
PHOSPHATE SERPL-MCNC: 5.1 MG/DL (ref 2.5–4.9)
PLATELET # BLD AUTO: 163 10^3/UL (ref 134–434)
PMV BLD: 10.5 FL (ref 7.5–11.1)
PROT SERPL-MCNC: 6.7 G/DL (ref 6.4–8.2)
RBC # BLD AUTO: 2.62 M/MM3 (ref 4–5.6)
SODIUM SERPL-SCNC: 145 MMOL/L (ref 136–145)
TIBC SERPL-MCNC: 292 UG/DL (ref 250–450)
TRIGL SERPL-MCNC: 81 MG/DL (ref 0–150)
WBC # BLD AUTO: 6 K/MM3 (ref 4–10)

## 2022-01-08 PROCEDURE — 5A1D70Z PERFORMANCE OF URINARY FILTRATION, INTERMITTENT, LESS THAN 6 HOURS PER DAY: ICD-10-PCS | Performed by: INTERNAL MEDICINE

## 2022-01-08 RX ADMIN — INSULIN ASPART SCH: 100 INJECTION, SOLUTION INTRAVENOUS; SUBCUTANEOUS at 12:28

## 2022-01-08 RX ADMIN — METOPROLOL TARTRATE SCH MG: 50 TABLET, FILM COATED ORAL at 09:54

## 2022-01-08 RX ADMIN — ASPIRIN SCH MG: 81 TABLET, COATED ORAL at 09:54

## 2022-01-08 RX ADMIN — LEVOTHYROXINE SODIUM SCH MCG: 25 TABLET ORAL at 09:54

## 2022-01-08 RX ADMIN — INSULIN ASPART SCH UNITS: 100 INJECTION, SOLUTION INTRAVENOUS; SUBCUTANEOUS at 09:54

## 2022-01-08 RX ADMIN — HYDRALAZINE HYDROCHLORIDE SCH MG: 50 TABLET, FILM COATED ORAL at 06:09

## 2022-01-08 RX ADMIN — PANTOPRAZOLE SODIUM SCH MG: 40 TABLET, DELAYED RELEASE ORAL at 09:54

## 2022-01-08 RX ADMIN — HEPARIN SODIUM SCH UNIT: 5000 INJECTION, SOLUTION INTRAVENOUS; SUBCUTANEOUS at 14:56

## 2022-01-08 RX ADMIN — HYDRALAZINE HYDROCHLORIDE SCH MG: 50 TABLET, FILM COATED ORAL at 14:56

## 2022-01-08 RX ADMIN — INSULIN ASPART SCH: 100 INJECTION, SOLUTION INTRAVENOUS; SUBCUTANEOUS at 17:19

## 2022-01-08 RX ADMIN — FUROSEMIDE SCH MG: 10 INJECTION, SOLUTION INTRAVENOUS at 09:54

## 2022-01-08 RX ADMIN — HEPARIN SODIUM SCH UNIT: 5000 INJECTION, SOLUTION INTRAVENOUS; SUBCUTANEOUS at 06:09

## 2022-01-09 LAB
ALBUMIN SERPL-MCNC: 3.4 G/DL (ref 3.4–5)
ALP SERPL-CCNC: 236 U/L (ref 45–117)
ALT SERPL-CCNC: 55 U/L (ref 13–61)
ANION GAP SERPL CALC-SCNC: 11 MMOL/L (ref 8–16)
APPEARANCE UR: CLEAR
AST SERPL-CCNC: 28 U/L (ref 15–37)
BACTERIA # UR AUTO: 6 /UL (ref 0–1359)
BASOPHILS # BLD: 1.2 % (ref 0–2)
BILIRUB SERPL-MCNC: 1.1 MG/DL (ref 0.2–1)
BILIRUB UR STRIP.AUTO-MCNC: NEGATIVE MG/DL
BUN SERPL-MCNC: 82.2 MG/DL (ref 7–18)
CALCIUM SERPL-MCNC: 9.2 MG/DL (ref 8.5–10.1)
CASTS URNS QL MICRO: 0 /UL (ref 0–3.1)
CHLORIDE SERPL-SCNC: 108 MMOL/L (ref 98–107)
CO2 SERPL-SCNC: 28 MMOL/L (ref 21–32)
COLOR UR: YELLOW
CREAT SERPL-MCNC: 4.1 MG/DL (ref 0.55–1.3)
DEPRECATED RDW RBC AUTO: 14.5 % (ref 11.9–15.9)
EOSINOPHIL # BLD: 5.1 % (ref 0–4.5)
EPITH CASTS URNS QL MICRO: 1 /UL (ref 0–25.1)
GLUCOSE SERPL-MCNC: 117 MG/DL (ref 74–106)
HCT VFR BLD CALC: 28.2 % (ref 35.4–49)
HGB BLD-MCNC: 9.1 GM/DL (ref 11.7–16.9)
IRON SERPL-MCNC: 54 UG/DL (ref 50–175)
KETONES UR QL STRIP: NEGATIVE
LEUKOCYTE ESTERASE UR QL STRIP.AUTO: NEGATIVE
LYMPHOCYTES # BLD: 9.3 % (ref 8–40)
MCH RBC QN AUTO: 31.8 PG (ref 25.7–33.7)
MCHC RBC AUTO-ENTMCNC: 32.3 G/DL (ref 32–35.9)
MCV RBC: 98.5 FL (ref 80–96)
MONOCYTES # BLD AUTO: 8.8 % (ref 3.8–10.2)
NEUTROPHILS # BLD: 75.6 % (ref 42.8–82.8)
NITRITE UR QL STRIP: NEGATIVE
PH UR: 7 [PH] (ref 5–8)
PLATELET # BLD AUTO: 158 10^3/UL (ref 134–434)
PMV BLD: 9.9 FL (ref 7.5–11.1)
PROT SERPL-MCNC: 6.7 G/DL (ref 6.4–8.2)
PROT UR QL STRIP: (no result)
PROT UR QL STRIP: NEGATIVE
RBC # BLD AUTO: 2.86 M/MM3 (ref 4–5.6)
RBC # BLD AUTO: 3 /UL (ref 0–23.9)
SODIUM SERPL-SCNC: 146 MMOL/L (ref 136–145)
SP GR UR: 1.01 (ref 1.01–1.03)
TIBC SERPL-MCNC: 265 UG/DL (ref 250–450)
UROBILINOGEN UR STRIP-MCNC: 0.2 MG/DL (ref 0.2–1)
WBC # BLD AUTO: 5.3 K/MM3 (ref 4–10)
WBC # UR AUTO: 1 /UL (ref 0–25.8)

## 2022-01-09 RX ADMIN — HYDRALAZINE HYDROCHLORIDE SCH MG: 50 TABLET, FILM COATED ORAL at 14:55

## 2022-01-09 RX ADMIN — HEPARIN SODIUM SCH UNIT: 5000 INJECTION, SOLUTION INTRAVENOUS; SUBCUTANEOUS at 06:53

## 2022-01-09 RX ADMIN — INSULIN ASPART SCH: 100 INJECTION, SOLUTION INTRAVENOUS; SUBCUTANEOUS at 12:15

## 2022-01-09 RX ADMIN — HEPARIN SODIUM SCH UNIT: 5000 INJECTION, SOLUTION INTRAVENOUS; SUBCUTANEOUS at 14:55

## 2022-01-09 RX ADMIN — INSULIN ASPART SCH: 100 INJECTION, SOLUTION INTRAVENOUS; SUBCUTANEOUS at 00:57

## 2022-01-09 RX ADMIN — HEPARIN SODIUM SCH UNIT: 5000 INJECTION, SOLUTION INTRAVENOUS; SUBCUTANEOUS at 00:49

## 2022-01-09 RX ADMIN — HYDRALAZINE HYDROCHLORIDE SCH MG: 50 TABLET, FILM COATED ORAL at 21:24

## 2022-01-09 RX ADMIN — ASPIRIN SCH MG: 81 TABLET, COATED ORAL at 09:40

## 2022-01-09 RX ADMIN — METOPROLOL TARTRATE SCH MG: 50 TABLET, FILM COATED ORAL at 21:24

## 2022-01-09 RX ADMIN — HYDRALAZINE HYDROCHLORIDE SCH MG: 50 TABLET, FILM COATED ORAL at 00:49

## 2022-01-09 RX ADMIN — METOPROLOL TARTRATE SCH MG: 50 TABLET, FILM COATED ORAL at 00:49

## 2022-01-09 RX ADMIN — LEVOTHYROXINE SODIUM SCH MCG: 25 TABLET ORAL at 06:53

## 2022-01-09 RX ADMIN — HYDRALAZINE HYDROCHLORIDE SCH MG: 50 TABLET, FILM COATED ORAL at 06:53

## 2022-01-09 RX ADMIN — INSULIN ASPART SCH UNITS: 100 INJECTION, SOLUTION INTRAVENOUS; SUBCUTANEOUS at 17:29

## 2022-01-09 RX ADMIN — ATORVASTATIN CALCIUM SCH MG: 40 TABLET, FILM COATED ORAL at 21:24

## 2022-01-09 RX ADMIN — FUROSEMIDE SCH MG: 10 INJECTION, SOLUTION INTRAVENOUS at 09:39

## 2022-01-09 RX ADMIN — METOPROLOL TARTRATE SCH MG: 50 TABLET, FILM COATED ORAL at 09:40

## 2022-01-09 RX ADMIN — PANTOPRAZOLE SODIUM SCH MG: 40 TABLET, DELAYED RELEASE ORAL at 09:39

## 2022-01-09 RX ADMIN — ATORVASTATIN CALCIUM SCH MG: 40 TABLET, FILM COATED ORAL at 00:48

## 2022-01-09 RX ADMIN — HEPARIN SODIUM SCH UNIT: 5000 INJECTION, SOLUTION INTRAVENOUS; SUBCUTANEOUS at 21:24

## 2022-01-09 RX ADMIN — INSULIN ASPART SCH: 100 INJECTION, SOLUTION INTRAVENOUS; SUBCUTANEOUS at 06:53

## 2022-01-09 RX ADMIN — INSULIN ASPART SCH UNITS: 100 INJECTION, SOLUTION INTRAVENOUS; SUBCUTANEOUS at 21:28

## 2022-01-10 LAB
ALBUMIN SERPL-MCNC: 3.2 G/DL (ref 3.4–5)
ALP SERPL-CCNC: 243 U/L (ref 45–117)
ALT SERPL-CCNC: 48 U/L (ref 13–61)
ANION GAP SERPL CALC-SCNC: 7 MMOL/L (ref 8–16)
AST SERPL-CCNC: 24 U/L (ref 15–37)
BASOPHILS # BLD: 1.2 % (ref 0–2)
BILIRUB SERPL-MCNC: 0.8 MG/DL (ref 0.2–1)
BUN SERPL-MCNC: 80.7 MG/DL (ref 7–18)
CALCIUM SERPL-MCNC: 8.6 MG/DL (ref 8.5–10.1)
CHLORIDE SERPL-SCNC: 107 MMOL/L (ref 98–107)
CO2 SERPL-SCNC: 30 MMOL/L (ref 21–32)
CREAT SERPL-MCNC: 3.9 MG/DL (ref 0.55–1.3)
DEPRECATED RDW RBC AUTO: 14.1 % (ref 11.9–15.9)
EOSINOPHIL # BLD: 5.9 % (ref 0–4.5)
GLUCOSE SERPL-MCNC: 168 MG/DL (ref 74–106)
HCT VFR BLD CALC: 28.4 % (ref 35.4–49)
HGB BLD-MCNC: 9.8 GM/DL (ref 11.7–16.9)
LYMPHOCYTES # BLD: 10.3 % (ref 8–40)
MCH RBC QN AUTO: 33.6 PG (ref 25.7–33.7)
MCHC RBC AUTO-ENTMCNC: 34.5 G/DL (ref 32–35.9)
MCV RBC: 97.5 FL (ref 80–96)
MONOCYTES # BLD AUTO: 8.6 % (ref 3.8–10.2)
NEUTROPHILS # BLD: 74 % (ref 42.8–82.8)
PLATELET # BLD AUTO: 167 10^3/UL (ref 134–434)
PMV BLD: 9.8 FL (ref 7.5–11.1)
PROT SERPL-MCNC: 6.4 G/DL (ref 6.4–8.2)
RBC # BLD AUTO: 2.91 M/MM3 (ref 4–5.6)
SODIUM SERPL-SCNC: 144 MMOL/L (ref 136–145)
WBC # BLD AUTO: 4.8 K/MM3 (ref 4–10)

## 2022-01-10 RX ADMIN — HYDRALAZINE HYDROCHLORIDE SCH MG: 50 TABLET, FILM COATED ORAL at 22:26

## 2022-01-10 RX ADMIN — INSULIN ASPART SCH UNITS: 100 INJECTION, SOLUTION INTRAVENOUS; SUBCUTANEOUS at 06:09

## 2022-01-10 RX ADMIN — HEPARIN SODIUM SCH UNIT: 5000 INJECTION, SOLUTION INTRAVENOUS; SUBCUTANEOUS at 22:26

## 2022-01-10 RX ADMIN — INSULIN ASPART SCH UNITS: 100 INJECTION, SOLUTION INTRAVENOUS; SUBCUTANEOUS at 13:13

## 2022-01-10 RX ADMIN — HEPARIN SODIUM SCH UNIT: 5000 INJECTION, SOLUTION INTRAVENOUS; SUBCUTANEOUS at 15:15

## 2022-01-10 RX ADMIN — METOPROLOL TARTRATE SCH MG: 50 TABLET, FILM COATED ORAL at 11:15

## 2022-01-10 RX ADMIN — PANTOPRAZOLE SODIUM SCH MG: 40 TABLET, DELAYED RELEASE ORAL at 11:15

## 2022-01-10 RX ADMIN — METOPROLOL TARTRATE SCH MG: 50 TABLET, FILM COATED ORAL at 22:26

## 2022-01-10 RX ADMIN — INSULIN ASPART SCH UNITS: 100 INJECTION, SOLUTION INTRAVENOUS; SUBCUTANEOUS at 17:22

## 2022-01-10 RX ADMIN — INSULIN ASPART SCH UNITS: 100 INJECTION, SOLUTION INTRAVENOUS; SUBCUTANEOUS at 22:22

## 2022-01-10 RX ADMIN — HYDRALAZINE HYDROCHLORIDE SCH MG: 50 TABLET, FILM COATED ORAL at 05:59

## 2022-01-10 RX ADMIN — HEPARIN SODIUM SCH UNIT: 5000 INJECTION, SOLUTION INTRAVENOUS; SUBCUTANEOUS at 06:11

## 2022-01-10 RX ADMIN — HYDRALAZINE HYDROCHLORIDE SCH MG: 50 TABLET, FILM COATED ORAL at 15:15

## 2022-01-10 RX ADMIN — ASPIRIN SCH MG: 81 TABLET, COATED ORAL at 11:15

## 2022-01-10 RX ADMIN — LEVOTHYROXINE SODIUM SCH MCG: 25 TABLET ORAL at 05:59

## 2022-01-11 PROCEDURE — 06H033Z INSERTION OF INFUSION DEVICE INTO INFERIOR VENA CAVA, PERCUTANEOUS APPROACH: ICD-10-PCS | Performed by: SURGERY

## 2022-01-11 PROCEDURE — B549ZZA ULTRASONOGRAPHY OF INFERIOR VENA CAVA, GUIDANCE: ICD-10-PCS | Performed by: SURGERY

## 2022-01-11 RX ADMIN — HEPARIN SODIUM SCH: 5000 INJECTION, SOLUTION INTRAVENOUS; SUBCUTANEOUS at 13:31

## 2022-01-11 RX ADMIN — INSULIN ASPART SCH: 100 INJECTION, SOLUTION INTRAVENOUS; SUBCUTANEOUS at 21:33

## 2022-01-11 RX ADMIN — HEPARIN SODIUM SCH UNIT: 5000 INJECTION, SOLUTION INTRAVENOUS; SUBCUTANEOUS at 06:17

## 2022-01-11 RX ADMIN — HYDRALAZINE HYDROCHLORIDE SCH MG: 50 TABLET, FILM COATED ORAL at 21:24

## 2022-01-11 RX ADMIN — INSULIN ASPART SCH: 100 INJECTION, SOLUTION INTRAVENOUS; SUBCUTANEOUS at 11:32

## 2022-01-11 RX ADMIN — METOPROLOL TARTRATE SCH MG: 50 TABLET, FILM COATED ORAL at 10:57

## 2022-01-11 RX ADMIN — HEPARIN SODIUM SCH UNIT: 5000 INJECTION, SOLUTION INTRAVENOUS; SUBCUTANEOUS at 21:24

## 2022-01-11 RX ADMIN — LEVOTHYROXINE SODIUM SCH MCG: 25 TABLET ORAL at 06:17

## 2022-01-11 RX ADMIN — INSULIN ASPART SCH: 100 INJECTION, SOLUTION INTRAVENOUS; SUBCUTANEOUS at 06:30

## 2022-01-11 RX ADMIN — METOPROLOL TARTRATE SCH MG: 50 TABLET, FILM COATED ORAL at 21:24

## 2022-01-11 RX ADMIN — HYDRALAZINE HYDROCHLORIDE SCH MG: 50 TABLET, FILM COATED ORAL at 06:17

## 2022-01-11 RX ADMIN — PANTOPRAZOLE SODIUM SCH MG: 40 TABLET, DELAYED RELEASE ORAL at 10:57

## 2022-01-11 RX ADMIN — ASPIRIN SCH MG: 81 TABLET, COATED ORAL at 10:57

## 2022-01-11 RX ADMIN — INSULIN ASPART SCH: 100 INJECTION, SOLUTION INTRAVENOUS; SUBCUTANEOUS at 18:02

## 2022-01-11 RX ADMIN — HYDRALAZINE HYDROCHLORIDE SCH MG: 50 TABLET, FILM COATED ORAL at 13:33

## 2022-01-12 VITALS — TEMPERATURE: 98.3 F

## 2022-01-12 VITALS — DIASTOLIC BLOOD PRESSURE: 72 MMHG | SYSTOLIC BLOOD PRESSURE: 144 MMHG | HEART RATE: 71 BPM

## 2022-01-12 LAB
ANION GAP SERPL CALC-SCNC: 10 MMOL/L (ref 8–16)
BUN SERPL-MCNC: 40 MG/DL (ref 7–18)
CALCIUM SERPL-MCNC: 8.4 MG/DL (ref 8.5–10.1)
CHLORIDE SERPL-SCNC: 110 MMOL/L (ref 98–107)
CO2 SERPL-SCNC: 26 MMOL/L (ref 21–32)
CREAT SERPL-MCNC: 2.4 MG/DL (ref 0.55–1.3)
GLUCOSE SERPL-MCNC: 174 MG/DL (ref 74–106)
MAGNESIUM SERPL-MCNC: 2 MG/DL (ref 1.8–2.4)
PHOSPHATE SERPL-MCNC: 2.6 MG/DL (ref 2.5–4.9)
SODIUM SERPL-SCNC: 146 MMOL/L (ref 136–145)

## 2022-01-12 RX ADMIN — INSULIN ASPART SCH UNITS: 100 INJECTION, SOLUTION INTRAVENOUS; SUBCUTANEOUS at 06:20

## 2022-01-12 RX ADMIN — METOPROLOL TARTRATE SCH MG: 50 TABLET, FILM COATED ORAL at 11:42

## 2022-01-12 RX ADMIN — INSULIN ASPART SCH UNITS: 100 INJECTION, SOLUTION INTRAVENOUS; SUBCUTANEOUS at 11:45

## 2022-01-12 RX ADMIN — INSULIN ASPART SCH: 100 INJECTION, SOLUTION INTRAVENOUS; SUBCUTANEOUS at 17:22

## 2022-01-12 RX ADMIN — HYDRALAZINE HYDROCHLORIDE SCH MG: 50 TABLET, FILM COATED ORAL at 17:57

## 2022-01-12 RX ADMIN — HEPARIN SODIUM SCH UNIT: 5000 INJECTION, SOLUTION INTRAVENOUS; SUBCUTANEOUS at 15:06

## 2022-01-12 RX ADMIN — HEPARIN SODIUM SCH UNIT: 5000 INJECTION, SOLUTION INTRAVENOUS; SUBCUTANEOUS at 06:18

## 2022-01-12 RX ADMIN — HYDRALAZINE HYDROCHLORIDE SCH MG: 50 TABLET, FILM COATED ORAL at 06:18

## 2022-01-28 ENCOUNTER — APPOINTMENT (OUTPATIENT)
Dept: HEART AND VASCULAR | Facility: CLINIC | Age: 77
End: 2022-01-28
Payer: MEDICARE

## 2022-01-28 VITALS
SYSTOLIC BLOOD PRESSURE: 70 MMHG | DIASTOLIC BLOOD PRESSURE: 60 MMHG | WEIGHT: 146 LBS | OXYGEN SATURATION: 98 % | TEMPERATURE: 98.7 F | BODY MASS INDEX: 24.32 KG/M2 | HEIGHT: 65 IN | HEART RATE: 50 BPM

## 2022-01-28 PROCEDURE — 99214 OFFICE O/P EST MOD 30 MIN: CPT

## 2022-01-28 RX ORDER — FUROSEMIDE 40 MG/1
40 TABLET ORAL
Refills: 0 | Status: DISCONTINUED | COMMUNITY
End: 2022-01-28

## 2022-01-29 RX ORDER — TORSEMIDE 100 MG/1
100 TABLET ORAL
Refills: 0 | Status: ACTIVE | COMMUNITY

## 2022-01-29 RX ORDER — ALLOPURINOL 100 MG/1
100 TABLET ORAL
Refills: 0 | Status: ACTIVE | COMMUNITY

## 2022-01-29 NOTE — REVIEW OF SYSTEMS
[Fever] : no fever [Blurry Vision] : no blurred vision [Earache] : no earache [Dyspnea on exertion] : dyspnea during exertion [Cough] : no cough [Abdominal Pain] : no abdominal pain [Urinary Frequency] : no change in urinary frequency [Joint Pain] : no joint pain [Rash] : no rash [Confusion] : no confusion was observed [Easy Bleeding] : no tendency for easy bleeding

## 2022-01-29 NOTE — ASSESSMENT
[FreeTextEntry1] : Chronic diastolic heart failure- on torsemide, euvolemic. ?amyloid heart. will discuss with CHF team utility of cardiac biopsy given new diagnossis of MM\par Severe AS- mean gradient 40mmhg on cath s/p BAV. TAVR CT done. Will discuss with Heme/Onc re timing of TAVR (pt has appointment next week)\par ESRD on HD- renal f/u\par CAD- asymptomatic, medically manage \par We discussed timing of potential TAVR, and will decide after MDT discussion with Heme/Renal

## 2022-01-29 NOTE — REASON FOR VISIT
[Cardiac Failure] : cardiac failure [CV Risk Factors and Non-Cardiac Disease] : CV risk factors and non-cardiac disease [Structural Heart and Valve Disease] : structural heart and valve disease [Coronary Artery Disease] : coronary artery disease

## 2022-01-29 NOTE — PHYSICAL EXAM
[Well Developed] : well developed [Well Nourished] : well nourished [No Acute Distress] : no acute distress [Normal Conjunctiva] : normal conjunctiva [Normal Venous Pressure] : normal venous pressure [No Carotid Bruit] : no carotid bruit [No Rub] : no rub [No Gallop] : no gallop [Clear Lung Fields] : clear lung fields [Good Air Entry] : good air entry [No Respiratory Distress] : no respiratory distress  [Soft] : abdomen soft [Non Tender] : non-tender [No Masses/organomegaly] : no masses/organomegaly [Normal Bowel Sounds] : normal bowel sounds [Normal Gait] : normal gait [No Edema] : no edema [No Cyanosis] : no cyanosis [No Clubbing] : no clubbing [No Varicosities] : no varicosities [No Rash] : no rash [No Skin Lesions] : no skin lesions [Moves all extremities] : moves all extremities [No Focal Deficits] : no focal deficits [Normal Speech] : normal speech [Alert and Oriented] : alert and oriented [Normal memory] : normal memory [de-identified] : +BETH with soft S2

## 2022-01-29 NOTE — HISTORY OF PRESENT ILLNESS
[FreeTextEntry1] : 75 y/o Turkish/English speaking male with PMHx HTN, HLD, IDDM, CKD (Stage IV, baseline Cr 2.5-3), carotid artery stenosis s/p carotid stents, PAD, anemia, hypothyroid, CAD, chronic dCHF, and severe AS who is s/p BAV with improved gradients (mean 41mmHg to 17mmHg).\par Of note-pt had successful BAV on 9/10/2021. After the BAV creatinine improved to 2.5 and pt was discharged POD#1. He had a recent diagnosis of multiple myeloma and has been started on HD since last seen.\par Today pt reports stable NYHA II symptoms, denies CP/SOB. LE swelling is improved since HD initiation.\par We discussed options TAVR. \par

## 2022-01-30 ENCOUNTER — RESULT REVIEW (OUTPATIENT)
Age: 77
End: 2022-01-30

## 2022-02-01 ENCOUNTER — APPOINTMENT (OUTPATIENT)
Dept: VASCULAR SURGERY | Facility: HOSPITAL | Age: 77
End: 2022-02-01

## 2022-02-10 LAB
ALBUMIN SERPL ELPH-MCNC: 4.4 G/DL
ALBUMIN SERPL ELPH-MCNC: 4.6 G/DL
ALP BLD-CCNC: 208 U/L
ALP BLD-CCNC: 213 U/L
ALT SERPL-CCNC: 35 U/L
ALT SERPL-CCNC: 55 U/L
ANION GAP SERPL CALC-SCNC: 13 MMOL/L
ANION GAP SERPL CALC-SCNC: 21 MMOL/L
AST SERPL-CCNC: 37 U/L
AST SERPL-CCNC: 41 U/L
BASOPHILS # BLD AUTO: 0.04 K/UL
BASOPHILS NFR BLD AUTO: 0.5 %
BILIRUB SERPL-MCNC: 0.4 MG/DL
BILIRUB SERPL-MCNC: 0.4 MG/DL
BUN SERPL-MCNC: 117 MG/DL
BUN SERPL-MCNC: 64 MG/DL
CALCIUM SERPL-MCNC: 9.3 MG/DL
CALCIUM SERPL-MCNC: 9.6 MG/DL
CHLORIDE SERPL-SCNC: 105 MMOL/L
CHLORIDE SERPL-SCNC: 95 MMOL/L
CO2 SERPL-SCNC: 20 MMOL/L
CO2 SERPL-SCNC: 23 MMOL/L
CREAT SERPL-MCNC: 2.92 MG/DL
CREAT SERPL-MCNC: 8.07 MG/DL
EOSINOPHIL # BLD AUTO: 0.26 K/UL
EOSINOPHIL NFR BLD AUTO: 3.4 %
GLUCOSE SERPL-MCNC: 182 MG/DL
GLUCOSE SERPL-MCNC: 215 MG/DL
HCT VFR BLD CALC: 27.4 %
HGB BLD-MCNC: 8.9 G/DL
IMM GRANULOCYTES NFR BLD AUTO: 0.5 %
LYMPHOCYTES # BLD AUTO: 0.81 K/UL
LYMPHOCYTES NFR BLD AUTO: 10.7 %
MAN DIFF?: NORMAL
MCHC RBC-ENTMCNC: 31.7 PG
MCHC RBC-ENTMCNC: 32.5 GM/DL
MCV RBC AUTO: 97.5 FL
MONOCYTES # BLD AUTO: 0.61 K/UL
MONOCYTES NFR BLD AUTO: 8.1 %
NEUTROPHILS # BLD AUTO: 5.78 K/UL
NEUTROPHILS NFR BLD AUTO: 76.8 %
PLATELET # BLD AUTO: 182 K/UL
POTASSIUM SERPL-SCNC: 5.9 MMOL/L
POTASSIUM SERPL-SCNC: 6 MMOL/L
PROT SERPL-MCNC: 6.9 G/DL
PROT SERPL-MCNC: 7.1 G/DL
RBC # BLD: 2.81 M/UL
RBC # FLD: 13 %
SODIUM SERPL-SCNC: 136 MMOL/L
SODIUM SERPL-SCNC: 141 MMOL/L
WBC # FLD AUTO: 7.54 K/UL

## 2022-02-18 ENCOUNTER — APPOINTMENT (OUTPATIENT)
Dept: VASCULAR SURGERY | Facility: CLINIC | Age: 77
End: 2022-02-18
Payer: MEDICARE

## 2022-02-18 PROCEDURE — 99024 POSTOP FOLLOW-UP VISIT: CPT

## 2022-04-09 ENCOUNTER — RESULT REVIEW (OUTPATIENT)
Age: 77
End: 2022-04-09

## 2022-04-12 ENCOUNTER — INPATIENT (INPATIENT)
Facility: HOSPITAL | Age: 77
LOS: 1 days | Discharge: HOME | DRG: 266 | End: 2022-04-14
Attending: INTERNAL MEDICINE | Admitting: INTERNAL MEDICINE
Payer: MEDICARE

## 2022-04-12 ENCOUNTER — TRANSCRIPTION ENCOUNTER (OUTPATIENT)
Age: 77
End: 2022-04-12

## 2022-04-12 VITALS — TEMPERATURE: 98 F

## 2022-04-12 DIAGNOSIS — Z86.79 PERSONAL HISTORY OF OTHER DISEASES OF THE CIRCULATORY SYSTEM: Chronic | ICD-10-CM

## 2022-04-12 DIAGNOSIS — Z98.890 OTHER SPECIFIED POSTPROCEDURAL STATES: Chronic | ICD-10-CM

## 2022-04-12 LAB
A1C WITH ESTIMATED AVERAGE GLUCOSE RESULT: 6.9 % — HIGH (ref 4–5.6)
ALBUMIN SERPL ELPH-MCNC: 4.6 G/DL — SIGNIFICANT CHANGE UP (ref 3.3–5)
ALP SERPL-CCNC: 247 U/L — HIGH (ref 40–120)
ALT FLD-CCNC: 29 U/L — SIGNIFICANT CHANGE UP (ref 10–45)
ANION GAP SERPL CALC-SCNC: 14 MMOL/L — SIGNIFICANT CHANGE UP (ref 5–17)
APPEARANCE UR: CLEAR — SIGNIFICANT CHANGE UP
APTT BLD: 34.2 SEC — SIGNIFICANT CHANGE UP (ref 27.5–35.5)
AST SERPL-CCNC: 30 U/L — SIGNIFICANT CHANGE UP (ref 10–40)
BASOPHILS # BLD AUTO: 0.03 K/UL — SIGNIFICANT CHANGE UP (ref 0–0.2)
BASOPHILS NFR BLD AUTO: 0.5 % — SIGNIFICANT CHANGE UP (ref 0–2)
BILIRUB SERPL-MCNC: 0.4 MG/DL — SIGNIFICANT CHANGE UP (ref 0.2–1.2)
BILIRUB UR-MCNC: NEGATIVE — SIGNIFICANT CHANGE UP
BLD GP AB SCN SERPL QL: NEGATIVE — SIGNIFICANT CHANGE UP
BLD GP AB SCN SERPL QL: NEGATIVE — SIGNIFICANT CHANGE UP
BUN SERPL-MCNC: 84 MG/DL — HIGH (ref 7–23)
CALCIUM SERPL-MCNC: 9.3 MG/DL — SIGNIFICANT CHANGE UP (ref 8.4–10.5)
CHLORIDE SERPL-SCNC: 104 MMOL/L — SIGNIFICANT CHANGE UP (ref 96–108)
CHOLEST SERPL-MCNC: 165 MG/DL — SIGNIFICANT CHANGE UP
CK MB CFR SERPL CALC: 4.4 NG/ML — SIGNIFICANT CHANGE UP (ref 0–6.7)
CK SERPL-CCNC: 155 U/L — SIGNIFICANT CHANGE UP (ref 30–200)
CO2 SERPL-SCNC: 22 MMOL/L — SIGNIFICANT CHANGE UP (ref 22–31)
COLOR SPEC: YELLOW — SIGNIFICANT CHANGE UP
CREAT SERPL-MCNC: 4.48 MG/DL — HIGH (ref 0.5–1.3)
DIFF PNL FLD: ABNORMAL
EGFR: 13 ML/MIN/1.73M2 — LOW
EOSINOPHIL # BLD AUTO: 0.21 K/UL — SIGNIFICANT CHANGE UP (ref 0–0.5)
EOSINOPHIL NFR BLD AUTO: 3.8 % — SIGNIFICANT CHANGE UP (ref 0–6)
ESTIMATED AVERAGE GLUCOSE: 151 MG/DL — HIGH (ref 68–114)
GLUCOSE BLDC GLUCOMTR-MCNC: 188 MG/DL — HIGH (ref 70–99)
GLUCOSE SERPL-MCNC: 259 MG/DL — HIGH (ref 70–99)
GLUCOSE UR QL: 250
HBV SURFACE AB SER-ACNC: SIGNIFICANT CHANGE UP
HCT VFR BLD CALC: 46 % — SIGNIFICANT CHANGE UP (ref 39–50)
HCV AB S/CO SERPL IA: 0.04 S/CO — SIGNIFICANT CHANGE UP
HCV AB SERPL-IMP: SIGNIFICANT CHANGE UP
HDLC SERPL-MCNC: 73 MG/DL — SIGNIFICANT CHANGE UP
HGB BLD-MCNC: 14.8 G/DL — SIGNIFICANT CHANGE UP (ref 13–17)
IMM GRANULOCYTES NFR BLD AUTO: 0.4 % — SIGNIFICANT CHANGE UP (ref 0–1.5)
INR BLD: 0.98 — SIGNIFICANT CHANGE UP (ref 0.88–1.16)
KETONES UR-MCNC: NEGATIVE — SIGNIFICANT CHANGE UP
LEUKOCYTE ESTERASE UR-ACNC: NEGATIVE — SIGNIFICANT CHANGE UP
LIPID PNL WITH DIRECT LDL SERPL: 78 MG/DL — SIGNIFICANT CHANGE UP
LYMPHOCYTES # BLD AUTO: 0.59 K/UL — LOW (ref 1–3.3)
LYMPHOCYTES # BLD AUTO: 10.8 % — LOW (ref 13–44)
MCHC RBC-ENTMCNC: 32.2 GM/DL — SIGNIFICANT CHANGE UP (ref 32–36)
MCHC RBC-ENTMCNC: 32.7 PG — SIGNIFICANT CHANGE UP (ref 27–34)
MCV RBC AUTO: 101.5 FL — HIGH (ref 80–100)
MONOCYTES # BLD AUTO: 0.54 K/UL — SIGNIFICANT CHANGE UP (ref 0–0.9)
MONOCYTES NFR BLD AUTO: 9.9 % — SIGNIFICANT CHANGE UP (ref 2–14)
NEUTROPHILS # BLD AUTO: 4.09 K/UL — SIGNIFICANT CHANGE UP (ref 1.8–7.4)
NEUTROPHILS NFR BLD AUTO: 74.6 % — SIGNIFICANT CHANGE UP (ref 43–77)
NITRITE UR-MCNC: NEGATIVE — SIGNIFICANT CHANGE UP
NON HDL CHOLESTEROL: 92 MG/DL — SIGNIFICANT CHANGE UP
NRBC # BLD: 0 /100 WBCS — SIGNIFICANT CHANGE UP (ref 0–0)
NT-PROBNP SERPL-SCNC: 2555 PG/ML — HIGH (ref 0–300)
PH UR: 6 — SIGNIFICANT CHANGE UP (ref 5–8)
PLATELET # BLD AUTO: 174 K/UL — SIGNIFICANT CHANGE UP (ref 150–400)
POTASSIUM SERPL-MCNC: 5.7 MMOL/L — HIGH (ref 3.5–5.3)
POTASSIUM SERPL-SCNC: 5.7 MMOL/L — HIGH (ref 3.5–5.3)
PROT SERPL-MCNC: 7.2 G/DL — SIGNIFICANT CHANGE UP (ref 6–8.3)
PROT UR-MCNC: 100 MG/DL
PROTHROM AB SERPL-ACNC: 11.6 SEC — SIGNIFICANT CHANGE UP (ref 10.5–13.4)
RBC # BLD: 4.53 M/UL — SIGNIFICANT CHANGE UP (ref 4.2–5.8)
RBC # FLD: 13.3 % — SIGNIFICANT CHANGE UP (ref 10.3–14.5)
RH IG SCN BLD-IMP: POSITIVE — SIGNIFICANT CHANGE UP
RH IG SCN BLD-IMP: POSITIVE — SIGNIFICANT CHANGE UP
SODIUM SERPL-SCNC: 140 MMOL/L — SIGNIFICANT CHANGE UP (ref 135–145)
SP GR SPEC: 1.02 — SIGNIFICANT CHANGE UP (ref 1–1.03)
T4 FREE SERPL-MCNC: 1.07 NG/DL — SIGNIFICANT CHANGE UP (ref 0.93–1.7)
TRIGL SERPL-MCNC: 68 MG/DL — SIGNIFICANT CHANGE UP
TROPONIN T SERPL-MCNC: 0.04 NG/ML — CRITICAL HIGH (ref 0–0.01)
TSH SERPL-MCNC: 5.57 UIU/ML — HIGH (ref 0.27–4.2)
UROBILINOGEN FLD QL: 0.2 E.U./DL — SIGNIFICANT CHANGE UP
WBC # BLD: 5.48 K/UL — SIGNIFICANT CHANGE UP (ref 3.8–10.5)
WBC # FLD AUTO: 5.48 K/UL — SIGNIFICANT CHANGE UP (ref 3.8–10.5)

## 2022-04-12 PROCEDURE — 99222 1ST HOSP IP/OBS MODERATE 55: CPT

## 2022-04-12 PROCEDURE — 71045 X-RAY EXAM CHEST 1 VIEW: CPT | Mod: 26

## 2022-04-12 RX ORDER — LEVOTHYROXINE SODIUM 125 MCG
25 TABLET ORAL DAILY
Refills: 0 | Status: DISCONTINUED | OUTPATIENT
Start: 2022-04-13 | End: 2022-04-14

## 2022-04-12 RX ORDER — CHLORHEXIDINE GLUCONATE 213 G/1000ML
1 SOLUTION TOPICAL ONCE
Refills: 0 | Status: COMPLETED | OUTPATIENT
Start: 2022-04-12 | End: 2022-04-12

## 2022-04-12 RX ORDER — SODIUM CHLORIDE 9 MG/ML
3 INJECTION INTRAMUSCULAR; INTRAVENOUS; SUBCUTANEOUS EVERY 8 HOURS
Refills: 0 | Status: DISCONTINUED | OUTPATIENT
Start: 2022-04-12 | End: 2022-04-13

## 2022-04-12 RX ORDER — METOPROLOL TARTRATE 50 MG
50 TABLET ORAL EVERY 6 HOURS
Refills: 0 | Status: DISCONTINUED | OUTPATIENT
Start: 2022-04-12 | End: 2022-04-13

## 2022-04-12 RX ORDER — SODIUM CHLORIDE 9 MG/ML
1000 INJECTION, SOLUTION INTRAVENOUS
Refills: 0 | Status: DISCONTINUED | OUTPATIENT
Start: 2022-04-12 | End: 2022-04-13

## 2022-04-12 RX ORDER — DORZOLAMIDE HYDROCHLORIDE 20 MG/ML
0 SOLUTION/ DROPS OPHTHALMIC
Qty: 0 | Refills: 0 | DISCHARGE

## 2022-04-12 RX ORDER — CHLORHEXIDINE GLUCONATE 213 G/1000ML
1 SOLUTION TOPICAL ONCE
Refills: 0 | Status: COMPLETED | OUTPATIENT
Start: 2022-04-12 | End: 2022-04-13

## 2022-04-12 RX ORDER — ATORVASTATIN CALCIUM 80 MG/1
40 TABLET, FILM COATED ORAL AT BEDTIME
Refills: 0 | Status: DISCONTINUED | OUTPATIENT
Start: 2022-04-12 | End: 2022-04-13

## 2022-04-12 RX ORDER — ALLOPURINOL 300 MG
1 TABLET ORAL
Qty: 0 | Refills: 0 | DISCHARGE

## 2022-04-12 RX ORDER — INSULIN LISPRO 100/ML
VIAL (ML) SUBCUTANEOUS
Refills: 0 | Status: DISCONTINUED | OUTPATIENT
Start: 2022-04-12 | End: 2022-04-13

## 2022-04-12 RX ORDER — CHLORHEXIDINE GLUCONATE 213 G/1000ML
12.5 SOLUTION TOPICAL ONCE
Refills: 0 | Status: COMPLETED | OUTPATIENT
Start: 2022-04-12 | End: 2022-04-13

## 2022-04-12 RX ORDER — GLUCAGON INJECTION, SOLUTION 0.5 MG/.1ML
1 INJECTION, SOLUTION SUBCUTANEOUS ONCE
Refills: 0 | Status: DISCONTINUED | OUTPATIENT
Start: 2022-04-12 | End: 2022-04-13

## 2022-04-12 RX ORDER — DEXTROSE 50 % IN WATER 50 %
12.5 SYRINGE (ML) INTRAVENOUS ONCE
Refills: 0 | Status: DISCONTINUED | OUTPATIENT
Start: 2022-04-12 | End: 2022-04-13

## 2022-04-12 RX ORDER — ALLOPURINOL 300 MG
100 TABLET ORAL DAILY
Refills: 0 | Status: DISCONTINUED | OUTPATIENT
Start: 2022-04-12 | End: 2022-04-13

## 2022-04-12 RX ORDER — AMLODIPINE BESYLATE 2.5 MG/1
10 TABLET ORAL DAILY
Refills: 0 | Status: DISCONTINUED | OUTPATIENT
Start: 2022-04-13 | End: 2022-04-13

## 2022-04-12 RX ORDER — ASPIRIN/CALCIUM CARB/MAGNESIUM 324 MG
81 TABLET ORAL DAILY
Refills: 0 | Status: DISCONTINUED | OUTPATIENT
Start: 2022-04-13 | End: 2022-04-13

## 2022-04-12 RX ORDER — CHLORHEXIDINE GLUCONATE 213 G/1000ML
1 SOLUTION TOPICAL ONCE
Refills: 0 | Status: COMPLETED | OUTPATIENT
Start: 2022-04-13 | End: 2022-04-13

## 2022-04-12 RX ORDER — DEXTROSE 50 % IN WATER 50 %
25 SYRINGE (ML) INTRAVENOUS ONCE
Refills: 0 | Status: DISCONTINUED | OUTPATIENT
Start: 2022-04-12 | End: 2022-04-13

## 2022-04-12 RX ORDER — HEPARIN SODIUM 5000 [USP'U]/ML
5000 INJECTION INTRAVENOUS; SUBCUTANEOUS EVERY 8 HOURS
Refills: 0 | Status: DISCONTINUED | OUTPATIENT
Start: 2022-04-12 | End: 2022-04-13

## 2022-04-12 RX ORDER — DEXTROSE 50 % IN WATER 50 %
15 SYRINGE (ML) INTRAVENOUS ONCE
Refills: 0 | Status: DISCONTINUED | OUTPATIENT
Start: 2022-04-12 | End: 2022-04-13

## 2022-04-12 RX ORDER — HYDRALAZINE HCL 50 MG
75 TABLET ORAL EVERY 8 HOURS
Refills: 0 | Status: DISCONTINUED | OUTPATIENT
Start: 2022-04-12 | End: 2022-04-13

## 2022-04-12 RX ORDER — INSULIN DEGLUDEC 100 U/ML
12 INJECTION, SOLUTION SUBCUTANEOUS
Qty: 0 | Refills: 0 | DISCHARGE

## 2022-04-12 RX ADMIN — CHLORHEXIDINE GLUCONATE 1 APPLICATION(S): 213 SOLUTION TOPICAL at 21:44

## 2022-04-12 RX ADMIN — Medication 75 MILLIGRAM(S): at 21:42

## 2022-04-12 RX ADMIN — HEPARIN SODIUM 5000 UNIT(S): 5000 INJECTION INTRAVENOUS; SUBCUTANEOUS at 21:42

## 2022-04-12 RX ADMIN — ATORVASTATIN CALCIUM 40 MILLIGRAM(S): 80 TABLET, FILM COATED ORAL at 21:42

## 2022-04-12 RX ADMIN — Medication 2: at 22:34

## 2022-04-12 RX ADMIN — Medication 50 MILLIGRAM(S): at 17:22

## 2022-04-12 RX ADMIN — SODIUM CHLORIDE 3 MILLILITER(S): 9 INJECTION INTRAMUSCULAR; INTRAVENOUS; SUBCUTANEOUS at 23:23

## 2022-04-12 NOTE — H&P ADULT - NSICDXPASTSURGICALHX_GEN_ALL_CORE_FT
PAST SURGICAL HISTORY:  H/O carotid artery stenosis s/p carotid stent x 2 in 2019    S/P balloon aortic valvuloplasty

## 2022-04-12 NOTE — H&P ADULT - NSHPPHYSICALEXAM_GEN_ALL_CORE
GEN: NAD, looks comfortable  Psych: Mood appropriate  Neuro: A&Ox3.  No focal deficits.  Moving all extremities.   HEENT: No obvious abnormalities  CV: + systolic murmur RUSB radiating to carotids. No JVD. RRR  Lungs: Clear B/L.  No wheezing, rales or rhonchi  ABD: Soft, non-tender, non-distended.  +Bowel sounds  EXT: Warm and well perfused.  No peripheral edema noted  Musculoskeletal: Moving all extremities with normal ROM, no joint swelling  PV: Pedal pulses palpable

## 2022-04-12 NOTE — PATIENT PROFILE ADULT - FUNCTIONAL ASSESSMENT - DAILY ACTIVITY 2.
Apixaban/Eliquis - Compliance/Apixaban/Eliquis - Follow up monitoring/Apixaban/Eliquis - Dietary Advice/Apixaban/Eliquis - Potential for adverse drug reactions and interactions
4 = No assist / stand by assistance

## 2022-04-12 NOTE — PATIENT PROFILE ADULT - FALL HARM RISK - HARM RISK INTERVENTIONS

## 2022-04-12 NOTE — CONSULT NOTE ADULT - SUBJECTIVE AND OBJECTIVE BOX
Patient is a 76y old  Male who presents with a chief complaint of TAVR    HPI:  75 y/o M with PMHx ESRD on HD TTS @[facility] in [place] who presents for TAVR. Last HD on Sat 4/9.   PMH: HTN, HLD, IDDM, carotid artery stenosis s/p carotid stents, PAD, anaemia, hypothyroid, CAD, chronic dCHF, and severe AS     PAST MEDICAL & SURGICAL HISTORY:  Hypertension    Hyperlipidemia    DM (diabetes mellitus)    H/O carotid artery stenosis    VISHNU (iron deficiency anemia)    Aortic stenosis    Stage 4 chronic kidney disease    Chronic diastolic congestive heart failure    H/O carotid artery stenosis  s/p carotid stent x 2 in 2019        Allergies:  No Known Allergies      Home Medications:   atorvastatin 40 milliGRAM(s) Oral at bedtime  hydrALAZINE 75 milliGRAM(s) Oral every 8 hours  levothyroxine 25 MICROGram(s) Oral daily  metoprolol tartrate 50 milliGRAM(s) Oral every 6 hours  sodium chloride 0.9% lock flush 3 milliLiter(s) IV Push every 8 hours      Hospital Medications:   MEDICATIONS  (STANDING):  atorvastatin 40 milliGRAM(s) Oral at bedtime  hydrALAZINE 75 milliGRAM(s) Oral every 8 hours  levothyroxine 25 MICROGram(s) Oral daily  metoprolol tartrate 50 milliGRAM(s) Oral every 6 hours  sodium chloride 0.9% lock flush 3 milliLiter(s) IV Push every 8 hours      Family History:  FAMILY HISTORY:  FH: diabetes mellitus        ROS:  RESPIRATORY: No shortness of breath  CARDIOVASCULAR: No Chest pain  MUSCULOSKELETAL: No leg swelling  All other ROS negative    VITALS:  T(F): 98.3 (04-12-22 @ 16:51), Max: 98.3 (04-12-22 @ 16:51)  HR: 56 (04-12-22 @ 15:02)  BP: 180/60 (04-12-22 @ 15:02)  RR: 18 (04-12-22 @ 15:02)  SpO2: 98% (04-12-22 @ 15:02)  Wt(kg): --    Height (cm): 165.1 (04-12 @ 15:02)  Weight (kg): 68.9 (04-12 @ 15:02)  BMI (kg/m2): 25.3 (04-12 @ 15:02)  BSA (m2): 1.76 (04-12 @ 15:02)  CAPILLARY BLOOD GLUCOSE          PHYSICAL EXAM:  GENERAL: Alert, awake, oriented x3 on RA  CHEST/LUNG: Bilateral clear breath sounds, no rales  HEART: Regular rate and rhythm, no murmur  ABDOMEN: Soft, nontender, non distended  EXTREMITIES: No pedal oedema  ACCESS: LUE AVF- immature; R TDC    LABS:        Creatinine Trend:       Urine Studies:

## 2022-04-12 NOTE — CONSULT NOTE ADULT - ASSESSMENT
Assessment/Plan:   75 y/o M with PMHx ESRD on HD TTS @[facility] in [place] who presents for TAVR. Last HD on Sat 4/9.   ESRD on HD TTS @[facility] in [place]  HD today per schedule and for preop optimisation  EDW tbd- records pending  BP: hypertensive, UF with HD  Anaemia- check iron panel and ferritin, epo with HD  BMD: check phos; not on phos binders  Access: R TDC, LUE AVF (created 2 wks prior per pt)  Daily weights, strict I&Os, renally dose meds, renal diet    PMH: HTN, HLD, IDDM, carotid artery stenosis s/p carotid stents, PAD, anaemia, hypothyroid, CAD, chronic dCHF, and severe AS     Thank you for the opportunity to participate in the care of your patient. The nephrology service remains available to assist with any questions or concerns. Please feel free to reach us by paging the on-call nephrology fellow for urgent issues or as below.     Gina Zaldivar M.D.   PGY-5  Pager: [phone]

## 2022-04-12 NOTE — H&P ADULT - ASSESSMENT
77 y/o Mongolian speaking male with PMHx HTN, HLD, IDDM (on Tresiba) CKD stage IV (on HD via right chest permacath), carotid artery stenosis s/p bilateral carotid stents (found after patient had TIA 3 years ago), anemia, hypothyroidism, CAD (s/p previous PCI per medical records, patient cannot recall), chronic diastolic CHF, and severe AS s/p BAV is now pre-op for TAVR on 4/13/22 with Dr. Cole. He denies any chest pain, syncopal episodes, lower extremity edema. Patient admitted for pre-op optimization with hemodialysis and possible PRBC transfusion.    A/P:  # Neurovascular  # Hx carotid stenosis s/p bilateral carotid stents, discovered after TIA 3 years ago  - No deficits  - Continue to monitor    # Cardiovascular  # Severe aortic stenosis s/p BAV   # HTN  # HLD  - Booked for TAVR 4/13  - Continue metoprolol, hydralazine, norvasc, lipitor (all confirmed with patient's son)  - Hemodynamically stable  - Monitor on telemetry    # Respiratory:   - 02 Sat = 98% on RA.  - will obtain portable CXR    # GI:   - Stable.  - no active issues. Tolerating PO diet  - NPO midnight    # Renal /   # ESRD on HD via right chest wall permacath  - LUE fistula still not mature  - Planning for HD tonight with possible PRBC transfusion  - Last HD 4/9    # Endocrine  # IDDM  - Labs pending  - On Tresiba at home  - Will order ISS for now    # Hematologic  # Iron deficiency anemia  - Labs pending  - Will transfuse if hgb < 10    # ID  - No active issues    # Prophylaxis:  - DVT prophylaxis with 5000 SubQ Heparin q8h.  - SCD's    Disposition:  - TAVR 4/13

## 2022-04-12 NOTE — H&P ADULT - HISTORY OF PRESENT ILLNESS
77 y/o Yakut speaking male with PMHx HTN, HLD, IDDM (on Tresiba) CKD stage IV (on HD via right chest permacath), carotid artery stenosis s/p bilateral carotid stents (found after patient had TIA 3 years ago), anemia, hypothyroidism, CAD (s/p previous PCI per medical records, patient cannot recall), chronic diastolic CHF, and severe AS s/p BAV is now pre-op for TAVR on 4/13/22 with Dr. Cole. He denies any chest pain, syncopal episodes, lower extremity edema. Patient admitted for pre-op optimization with hemodialysis and possible PRBC transfusion.    Last HD: 4/9/22

## 2022-04-12 NOTE — CONSULT NOTE ADULT - ATTENDING COMMENTS
ESRD on HD TTS via tunneled catheter with maturing AVF admitted for TAVR planned for tomorrow. Hypertensive urgency, please give dose of amlodipine and hydralazine he's due for. If BPs remain difficult to control we can add an ARB with lokelma on non dialysis days if hyperkalemic. 3L UF goal today w dialysis ESRD on HD TTS via tunneled catheter with maturing AVF admitted for TAVR planned for tomorrow. Hypertensive urgency, please give dose of amlodipine and hydralazine he's due for. If BPs remain difficult to control we can add an ARB with lokelma on non dialysis days if hyperkalemic. 3L UF goal today w dialysis  Please change diet to low potassium

## 2022-04-12 NOTE — H&P ADULT - NSHPREVIEWOFSYSTEMS_GEN_ALL_CORE
Review of Systems  CONSTITUTIONAL:  Denies Fevers / chills, sweats, fatigue, weight loss, weight gain                                      NEURO:  Denies paresthesias, seizures, syncope, confusion                                                                                EYES:  Denies Blurry vision, discharge, pain, loss of vision                                                                                    ENT:  Denies Difficulty hearing, vertigo, dysphagia, epistaxis, recent dental work                                       CV:  Denies Chest pain, palpitations, IRIZARRY, orthopnea                                                                                          RESPIRATORY:  Denies Wheezing, SOB, cough / sputum, hemoptysis                                                                GI:  Denies Nausea, vomiting, diarrhea, constipation, melena, difficulty swallowing                                               : Denies Hematuria, dysuria, urgency, incontinence                                                                                         MUSCULOSKELETAL:  Denies arthritis, joint swelling, muscle weakness                                                             SKIN/BREAST:  Denies rash, itching, hair loss, masses                                                                                            PSYCH:  Denies depression, anxiety, suicidal ideation                                                                                               HEME/LYMPH:  Denies bruises easily, enlarged lymph nodes, tender lymph nodes                                        ENDOCRINE:  Denies cold intolerance, heat intolerance, polydipsia

## 2022-04-12 NOTE — H&P ADULT - NSICDXPASTMEDICALHX_GEN_ALL_CORE_FT
PAST MEDICAL HISTORY:  Aortic stenosis     Chronic diastolic congestive heart failure     DM (diabetes mellitus)     H/O carotid artery stenosis     Hyperlipidemia     Hypertension     VISHNU (iron deficiency anemia)     Stage 4 chronic kidney disease

## 2022-04-13 ENCOUNTER — NON-APPOINTMENT (OUTPATIENT)
Age: 77
End: 2022-04-13

## 2022-04-13 ENCOUNTER — APPOINTMENT (OUTPATIENT)
Dept: CARDIOTHORACIC SURGERY | Facility: HOSPITAL | Age: 77
End: 2022-04-13

## 2022-04-13 ENCOUNTER — APPOINTMENT (OUTPATIENT)
Dept: CARDIOTHORACIC SURGERY | Facility: CLINIC | Age: 77
End: 2022-04-13
Payer: MEDICARE

## 2022-04-13 LAB
ALBUMIN SERPL ELPH-MCNC: 3.9 G/DL — SIGNIFICANT CHANGE UP (ref 3.3–5)
ALBUMIN SERPL ELPH-MCNC: 3.9 G/DL — SIGNIFICANT CHANGE UP (ref 3.3–5)
ALBUMIN SERPL ELPH-MCNC: 4.1 G/DL — SIGNIFICANT CHANGE UP (ref 3.3–5)
ALP SERPL-CCNC: 155 U/L — HIGH (ref 40–120)
ALP SERPL-CCNC: 158 U/L — HIGH (ref 40–120)
ALP SERPL-CCNC: 182 U/L — HIGH (ref 40–120)
ALT FLD-CCNC: 22 U/L — SIGNIFICANT CHANGE UP (ref 10–45)
ALT FLD-CCNC: 23 U/L — SIGNIFICANT CHANGE UP (ref 10–45)
ALT FLD-CCNC: 25 U/L — SIGNIFICANT CHANGE UP (ref 10–45)
ANION GAP SERPL CALC-SCNC: 11 MMOL/L — SIGNIFICANT CHANGE UP (ref 5–17)
ANION GAP SERPL CALC-SCNC: 11 MMOL/L — SIGNIFICANT CHANGE UP (ref 5–17)
ANION GAP SERPL CALC-SCNC: 12 MMOL/L — SIGNIFICANT CHANGE UP (ref 5–17)
ANISOCYTOSIS BLD QL: SLIGHT — SIGNIFICANT CHANGE UP
APTT BLD: 32.8 SEC — SIGNIFICANT CHANGE UP (ref 27.5–35.5)
APTT BLD: 34.9 SEC — SIGNIFICANT CHANGE UP (ref 27.5–35.5)
APTT BLD: 36.8 SEC — HIGH (ref 27.5–35.5)
AST SERPL-CCNC: 25 U/L — SIGNIFICANT CHANGE UP (ref 10–40)
AST SERPL-CCNC: 29 U/L — SIGNIFICANT CHANGE UP (ref 10–40)
AST SERPL-CCNC: 30 U/L — SIGNIFICANT CHANGE UP (ref 10–40)
BACTERIA # UR AUTO: SIGNIFICANT CHANGE UP /HPF
BASOPHILS # BLD AUTO: 0.06 K/UL — SIGNIFICANT CHANGE UP (ref 0–0.2)
BASOPHILS NFR BLD AUTO: 0.9 % — SIGNIFICANT CHANGE UP (ref 0–2)
BILIRUB SERPL-MCNC: 0.4 MG/DL — SIGNIFICANT CHANGE UP (ref 0.2–1.2)
BILIRUB SERPL-MCNC: 0.5 MG/DL — SIGNIFICANT CHANGE UP (ref 0.2–1.2)
BILIRUB SERPL-MCNC: 0.5 MG/DL — SIGNIFICANT CHANGE UP (ref 0.2–1.2)
BUN SERPL-MCNC: 26 MG/DL — HIGH (ref 7–23)
BUN SERPL-MCNC: 51 MG/DL — HIGH (ref 7–23)
BUN SERPL-MCNC: 54 MG/DL — HIGH (ref 7–23)
CALCIUM SERPL-MCNC: 8.6 MG/DL — SIGNIFICANT CHANGE UP (ref 8.4–10.5)
CALCIUM SERPL-MCNC: 8.8 MG/DL — SIGNIFICANT CHANGE UP (ref 8.4–10.5)
CALCIUM SERPL-MCNC: 9.1 MG/DL — SIGNIFICANT CHANGE UP (ref 8.4–10.5)
CHLORIDE SERPL-SCNC: 101 MMOL/L — SIGNIFICANT CHANGE UP (ref 96–108)
CHLORIDE SERPL-SCNC: 101 MMOL/L — SIGNIFICANT CHANGE UP (ref 96–108)
CHLORIDE SERPL-SCNC: 99 MMOL/L — SIGNIFICANT CHANGE UP (ref 96–108)
CO2 SERPL-SCNC: 24 MMOL/L — SIGNIFICANT CHANGE UP (ref 22–31)
CO2 SERPL-SCNC: 26 MMOL/L — SIGNIFICANT CHANGE UP (ref 22–31)
CO2 SERPL-SCNC: 26 MMOL/L — SIGNIFICANT CHANGE UP (ref 22–31)
CREAT SERPL-MCNC: 1.86 MG/DL — HIGH (ref 0.5–1.3)
CREAT SERPL-MCNC: 3.08 MG/DL — HIGH (ref 0.5–1.3)
CREAT SERPL-MCNC: 3.12 MG/DL — HIGH (ref 0.5–1.3)
EGFR: 20 ML/MIN/1.73M2 — LOW
EGFR: 20 ML/MIN/1.73M2 — LOW
EGFR: 37 ML/MIN/1.73M2 — LOW
EOSINOPHIL # BLD AUTO: 0.18 K/UL — SIGNIFICANT CHANGE UP (ref 0–0.5)
EOSINOPHIL NFR BLD AUTO: 2.6 % — SIGNIFICANT CHANGE UP (ref 0–6)
EPI CELLS # UR: SIGNIFICANT CHANGE UP /HPF (ref 0–5)
GAS PNL BLDA: SIGNIFICANT CHANGE UP
GLUCOSE BLDC GLUCOMTR-MCNC: 113 MG/DL — HIGH (ref 70–99)
GLUCOSE BLDC GLUCOMTR-MCNC: 151 MG/DL — HIGH (ref 70–99)
GLUCOSE BLDC GLUCOMTR-MCNC: 167 MG/DL — HIGH (ref 70–99)
GLUCOSE BLDC GLUCOMTR-MCNC: 97 MG/DL — SIGNIFICANT CHANGE UP (ref 70–99)
GLUCOSE SERPL-MCNC: 114 MG/DL — HIGH (ref 70–99)
GLUCOSE SERPL-MCNC: 130 MG/DL — HIGH (ref 70–99)
GLUCOSE SERPL-MCNC: 184 MG/DL — HIGH (ref 70–99)
HBV SURFACE AG SER-ACNC: REACTIVE
HCT VFR BLD CALC: 41.7 % — SIGNIFICANT CHANGE UP (ref 39–50)
HCT VFR BLD CALC: 42.6 % — SIGNIFICANT CHANGE UP (ref 39–50)
HCT VFR BLD CALC: 46.7 % — SIGNIFICANT CHANGE UP (ref 39–50)
HGB BLD-MCNC: 13.5 G/DL — SIGNIFICANT CHANGE UP (ref 13–17)
HGB BLD-MCNC: 14 G/DL — SIGNIFICANT CHANGE UP (ref 13–17)
HGB BLD-MCNC: 15.4 G/DL — SIGNIFICANT CHANGE UP (ref 13–17)
INR BLD: 1.03 — SIGNIFICANT CHANGE UP (ref 0.88–1.16)
INR BLD: 1.06 — SIGNIFICANT CHANGE UP (ref 0.88–1.16)
INR BLD: 1.11 — SIGNIFICANT CHANGE UP (ref 0.88–1.16)
ISTAT ARTERIAL BE: 0 MMOL/L — SIGNIFICANT CHANGE UP (ref -2–3)
ISTAT ARTERIAL GLUCOSE: 175 MG/DL — HIGH (ref 70–99)
ISTAT ARTERIAL HCO3: 26 MMOL/L — SIGNIFICANT CHANGE UP (ref 22–26)
ISTAT ARTERIAL HEMATOCRIT: 41 % — SIGNIFICANT CHANGE UP (ref 39–50)
ISTAT ARTERIAL HEMOGLOBIN: 13.9 G/DL — SIGNIFICANT CHANGE UP (ref 13–17)
ISTAT ARTERIAL IONIZED CALCIUM: 1.21 MMOL/L — SIGNIFICANT CHANGE UP (ref 1.12–1.3)
ISTAT ARTERIAL PCO2: 47 MMHG — HIGH (ref 35–45)
ISTAT ARTERIAL PH: 7.35 — SIGNIFICANT CHANGE UP (ref 7.35–7.45)
ISTAT ARTERIAL PO2: 102 MMHG — SIGNIFICANT CHANGE UP (ref 80–105)
ISTAT ARTERIAL POTASSIUM: 5.2 MMOL/L — SIGNIFICANT CHANGE UP (ref 3.5–5.3)
ISTAT ARTERIAL SO2: 97 % — SIGNIFICANT CHANGE UP (ref 95–98)
ISTAT ARTERIAL SODIUM: 136 MMOL/L — SIGNIFICANT CHANGE UP (ref 135–145)
ISTAT ARTERIAL TCO2: 28 MMOL/L — SIGNIFICANT CHANGE UP (ref 22–31)
LYMPHOCYTES # BLD AUTO: 0.18 K/UL — LOW (ref 1–3.3)
LYMPHOCYTES # BLD AUTO: 2.6 % — LOW (ref 13–44)
MACROCYTES BLD QL: SLIGHT — SIGNIFICANT CHANGE UP
MAGNESIUM SERPL-MCNC: 1.8 MG/DL — SIGNIFICANT CHANGE UP (ref 1.6–2.6)
MAGNESIUM SERPL-MCNC: 1.8 MG/DL — SIGNIFICANT CHANGE UP (ref 1.6–2.6)
MAGNESIUM SERPL-MCNC: 2.1 MG/DL — SIGNIFICANT CHANGE UP (ref 1.6–2.6)
MANUAL SMEAR VERIFICATION: SIGNIFICANT CHANGE UP
MCHC RBC-ENTMCNC: 32.4 GM/DL — SIGNIFICANT CHANGE UP (ref 32–36)
MCHC RBC-ENTMCNC: 32.7 PG — SIGNIFICANT CHANGE UP (ref 27–34)
MCHC RBC-ENTMCNC: 32.9 GM/DL — SIGNIFICANT CHANGE UP (ref 32–36)
MCHC RBC-ENTMCNC: 32.9 PG — SIGNIFICANT CHANGE UP (ref 27–34)
MCHC RBC-ENTMCNC: 33 GM/DL — SIGNIFICANT CHANGE UP (ref 32–36)
MCHC RBC-ENTMCNC: 33.8 PG — SIGNIFICANT CHANGE UP (ref 27–34)
MCV RBC AUTO: 100 FL — SIGNIFICANT CHANGE UP (ref 80–100)
MCV RBC AUTO: 101 FL — HIGH (ref 80–100)
MCV RBC AUTO: 102.4 FL — HIGH (ref 80–100)
MICROCYTES BLD QL: SLIGHT — SIGNIFICANT CHANGE UP
MONOCYTES # BLD AUTO: 0.36 K/UL — SIGNIFICANT CHANGE UP (ref 0–0.9)
MONOCYTES NFR BLD AUTO: 5.2 % — SIGNIFICANT CHANGE UP (ref 2–14)
NEUTROPHILS # BLD AUTO: 6.2 K/UL — SIGNIFICANT CHANGE UP (ref 1.8–7.4)
NEUTROPHILS NFR BLD AUTO: 87.8 % — HIGH (ref 43–77)
NEUTS BAND # BLD: 0.9 % — SIGNIFICANT CHANGE UP (ref 0–8)
NRBC # BLD: 0 /100 WBCS — SIGNIFICANT CHANGE UP (ref 0–0)
NRBC # BLD: 0 /100 WBCS — SIGNIFICANT CHANGE UP (ref 0–0)
OVALOCYTES BLD QL SMEAR: SLIGHT — SIGNIFICANT CHANGE UP
PHOSPHATE SERPL-MCNC: 2.6 MG/DL — SIGNIFICANT CHANGE UP (ref 2.5–4.5)
PHOSPHATE SERPL-MCNC: 4 MG/DL — SIGNIFICANT CHANGE UP (ref 2.5–4.5)
PLAT MORPH BLD: NORMAL — SIGNIFICANT CHANGE UP
PLATELET # BLD AUTO: 114 K/UL — LOW (ref 150–400)
PLATELET # BLD AUTO: 134 K/UL — LOW (ref 150–400)
PLATELET # BLD AUTO: 153 K/UL — SIGNIFICANT CHANGE UP (ref 150–400)
POLYCHROMASIA BLD QL SMEAR: SLIGHT — SIGNIFICANT CHANGE UP
POTASSIUM SERPL-MCNC: 4 MMOL/L — SIGNIFICANT CHANGE UP (ref 3.5–5.3)
POTASSIUM SERPL-MCNC: 4.6 MMOL/L — SIGNIFICANT CHANGE UP (ref 3.5–5.3)
POTASSIUM SERPL-MCNC: 5.4 MMOL/L — HIGH (ref 3.5–5.3)
POTASSIUM SERPL-SCNC: 4 MMOL/L — SIGNIFICANT CHANGE UP (ref 3.5–5.3)
POTASSIUM SERPL-SCNC: 4.6 MMOL/L — SIGNIFICANT CHANGE UP (ref 3.5–5.3)
POTASSIUM SERPL-SCNC: 5.4 MMOL/L — HIGH (ref 3.5–5.3)
PROT SERPL-MCNC: 6.3 G/DL — SIGNIFICANT CHANGE UP (ref 6–8.3)
PROT SERPL-MCNC: 6.3 G/DL — SIGNIFICANT CHANGE UP (ref 6–8.3)
PROT SERPL-MCNC: 6.8 G/DL — SIGNIFICANT CHANGE UP (ref 6–8.3)
PROTHROM AB SERPL-ACNC: 12.3 SEC — SIGNIFICANT CHANGE UP (ref 10.5–13.4)
PROTHROM AB SERPL-ACNC: 12.6 SEC — SIGNIFICANT CHANGE UP (ref 10.5–13.4)
PROTHROM AB SERPL-ACNC: 13.2 SEC — SIGNIFICANT CHANGE UP (ref 10.5–13.4)
RBC # BLD: 4.13 M/UL — LOW (ref 4.2–5.8)
RBC # BLD: 4.26 M/UL — SIGNIFICANT CHANGE UP (ref 4.2–5.8)
RBC # BLD: 4.56 M/UL — SIGNIFICANT CHANGE UP (ref 4.2–5.8)
RBC # FLD: 13.2 % — SIGNIFICANT CHANGE UP (ref 10.3–14.5)
RBC # FLD: 13.2 % — SIGNIFICANT CHANGE UP (ref 10.3–14.5)
RBC # FLD: 13.4 % — SIGNIFICANT CHANGE UP (ref 10.3–14.5)
RBC BLD AUTO: ABNORMAL
RBC CASTS # UR COMP ASSIST: < 5 /HPF — SIGNIFICANT CHANGE UP
SODIUM SERPL-SCNC: 136 MMOL/L — SIGNIFICANT CHANGE UP (ref 135–145)
SODIUM SERPL-SCNC: 137 MMOL/L — SIGNIFICANT CHANGE UP (ref 135–145)
SODIUM SERPL-SCNC: 138 MMOL/L — SIGNIFICANT CHANGE UP (ref 135–145)
SPHEROCYTES BLD QL SMEAR: SLIGHT — SIGNIFICANT CHANGE UP
WBC # BLD: 6.18 K/UL — SIGNIFICANT CHANGE UP (ref 3.8–10.5)
WBC # BLD: 6.4 K/UL — SIGNIFICANT CHANGE UP (ref 3.8–10.5)
WBC # BLD: 6.99 K/UL — SIGNIFICANT CHANGE UP (ref 3.8–10.5)
WBC # FLD AUTO: 6.18 K/UL — SIGNIFICANT CHANGE UP (ref 3.8–10.5)
WBC # FLD AUTO: 6.4 K/UL — SIGNIFICANT CHANGE UP (ref 3.8–10.5)
WBC # FLD AUTO: 6.99 K/UL — SIGNIFICANT CHANGE UP (ref 3.8–10.5)
WBC UR QL: < 5 /HPF — SIGNIFICANT CHANGE UP

## 2022-04-13 PROCEDURE — 99291 CRITICAL CARE FIRST HOUR: CPT

## 2022-04-13 PROCEDURE — MCOT1: CPT | Mod: NC

## 2022-04-13 PROCEDURE — 33361 REPLACE AORTIC VALVE PERQ: CPT | Mod: Q0,62

## 2022-04-13 PROCEDURE — 93308 TTE F-UP OR LMTD: CPT | Mod: 26

## 2022-04-13 PROCEDURE — 90935 HEMODIALYSIS ONE EVALUATION: CPT

## 2022-04-13 PROCEDURE — 71045 X-RAY EXAM CHEST 1 VIEW: CPT | Mod: 26

## 2022-04-13 PROCEDURE — 33361 REPLACE AORTIC VALVE PERQ: CPT | Mod: 62,Q0

## 2022-04-13 PROCEDURE — 99292 CRITICAL CARE ADDL 30 MIN: CPT

## 2022-04-13 DEVICE — SHEATH INTRODUCER TERUMO PINNACLE CORONARY 8FR X 10CM X 0.038" MINI WIRE: Type: IMPLANTABLE DEVICE | Status: FUNCTIONAL

## 2022-04-13 DEVICE — CATH DX PIG 145 INFIN 5FRX110CM: Type: IMPLANTABLE DEVICE | Status: FUNCTIONAL

## 2022-04-13 DEVICE — ANGIOSEAL VASC CLOS VIP 6FR: Type: IMPLANTABLE DEVICE | Status: FUNCTIONAL

## 2022-04-13 DEVICE — CATH DX AL1 INFIN 5FRX100CM: Type: IMPLANTABLE DEVICE | Status: FUNCTIONAL

## 2022-04-13 DEVICE — GWIRE GUID  0.035INX150CM: Type: IMPLANTABLE DEVICE | Status: FUNCTIONAL

## 2022-04-13 DEVICE — GUIDEWIRE STANDARD STRAIGHT .035" X 180CM: Type: IMPLANTABLE DEVICE | Status: FUNCTIONAL

## 2022-04-13 DEVICE — WIRE GD CONFIDA BRECKER CRV .035X260: Type: IMPLANTABLE DEVICE | Status: FUNCTIONAL

## 2022-04-13 DEVICE — GWIRE SUPRACORE .035X370: Type: IMPLANTABLE DEVICE | Status: FUNCTIONAL

## 2022-04-13 DEVICE — VLV HEART SAPIEN 3 ULTRA W/COMMANDER SYS 23MM: Type: IMPLANTABLE DEVICE | Status: FUNCTIONAL

## 2022-04-13 DEVICE — ANGIOSEAL VASC CLOS VIP 8FR: Type: IMPLANTABLE DEVICE | Status: FUNCTIONAL

## 2022-04-13 DEVICE — KIT PACE ELCTR BIPOLAR 5FR: Type: IMPLANTABLE DEVICE | Status: FUNCTIONAL

## 2022-04-13 DEVICE — SUT PERCLOSE PROGLIDE 6FR: Type: IMPLANTABLE DEVICE | Status: FUNCTIONAL

## 2022-04-13 DEVICE — SHEATH INTRODUCER TERUMO PINNACLE CORONARY 5FR X 10CM X 0.038" MINI WIRE: Type: IMPLANTABLE DEVICE | Status: FUNCTIONAL

## 2022-04-13 DEVICE — INTRO MICROPUNC 4FRX10CM SS: Type: IMPLANTABLE DEVICE | Status: FUNCTIONAL

## 2022-04-13 DEVICE — SHEATH INTRODUCER TERUMO PINNACLE PERIPHERAL 5FR X 10CM X 0.035" MINI WIRE: Type: IMPLANTABLE DEVICE | Status: FUNCTIONAL

## 2022-04-13 DEVICE — EMERALD GUIDEWIRE 0.35: Type: IMPLANTABLE DEVICE | Status: FUNCTIONAL

## 2022-04-13 RX ORDER — ASPIRIN/CALCIUM CARB/MAGNESIUM 324 MG
81 TABLET ORAL DAILY
Refills: 0 | Status: DISCONTINUED | OUTPATIENT
Start: 2022-04-14 | End: 2022-04-14

## 2022-04-13 RX ORDER — GLUCAGON INJECTION, SOLUTION 0.5 MG/.1ML
1 INJECTION, SOLUTION SUBCUTANEOUS ONCE
Refills: 0 | Status: DISCONTINUED | OUTPATIENT
Start: 2022-04-13 | End: 2022-04-14

## 2022-04-13 RX ORDER — ATORVASTATIN CALCIUM 80 MG/1
40 TABLET, FILM COATED ORAL AT BEDTIME
Refills: 0 | Status: DISCONTINUED | OUTPATIENT
Start: 2022-04-13 | End: 2022-04-14

## 2022-04-13 RX ORDER — DEXTROSE 50 % IN WATER 50 %
25 SYRINGE (ML) INTRAVENOUS ONCE
Refills: 0 | Status: DISCONTINUED | OUTPATIENT
Start: 2022-04-13 | End: 2022-04-14

## 2022-04-13 RX ORDER — DEXTROSE 50 % IN WATER 50 %
12.5 SYRINGE (ML) INTRAVENOUS ONCE
Refills: 0 | Status: DISCONTINUED | OUTPATIENT
Start: 2022-04-13 | End: 2022-04-14

## 2022-04-13 RX ORDER — SODIUM CHLORIDE 9 MG/ML
1000 INJECTION INTRAMUSCULAR; INTRAVENOUS; SUBCUTANEOUS
Refills: 0 | Status: DISCONTINUED | OUTPATIENT
Start: 2022-04-13 | End: 2022-04-14

## 2022-04-13 RX ORDER — HEPARIN SODIUM 5000 [USP'U]/ML
5000 INJECTION INTRAVENOUS; SUBCUTANEOUS EVERY 8 HOURS
Refills: 0 | Status: DISCONTINUED | OUTPATIENT
Start: 2022-04-13 | End: 2022-04-14

## 2022-04-13 RX ORDER — INSULIN LISPRO 100/ML
VIAL (ML) SUBCUTANEOUS
Refills: 0 | Status: DISCONTINUED | OUTPATIENT
Start: 2022-04-13 | End: 2022-04-14

## 2022-04-13 RX ORDER — CEFAZOLIN SODIUM 1 G
1000 VIAL (EA) INJECTION EVERY 12 HOURS
Refills: 0 | Status: COMPLETED | OUTPATIENT
Start: 2022-04-13 | End: 2022-04-14

## 2022-04-13 RX ORDER — DEXTROSE 50 % IN WATER 50 %
15 SYRINGE (ML) INTRAVENOUS ONCE
Refills: 0 | Status: DISCONTINUED | OUTPATIENT
Start: 2022-04-13 | End: 2022-04-14

## 2022-04-13 RX ORDER — SODIUM CHLORIDE 9 MG/ML
1000 INJECTION, SOLUTION INTRAVENOUS
Refills: 0 | Status: DISCONTINUED | OUTPATIENT
Start: 2022-04-13 | End: 2022-04-14

## 2022-04-13 RX ORDER — CEFAZOLIN SODIUM 1 G
2000 VIAL (EA) INJECTION EVERY 8 HOURS
Refills: 0 | Status: DISCONTINUED | OUTPATIENT
Start: 2022-04-13 | End: 2022-04-13

## 2022-04-13 RX ORDER — PANTOPRAZOLE SODIUM 20 MG/1
40 TABLET, DELAYED RELEASE ORAL DAILY
Refills: 0 | Status: DISCONTINUED | OUTPATIENT
Start: 2022-04-13 | End: 2022-04-13

## 2022-04-13 RX ORDER — ACETAMINOPHEN 500 MG
650 TABLET ORAL EVERY 6 HOURS
Refills: 0 | Status: DISCONTINUED | OUTPATIENT
Start: 2022-04-13 | End: 2022-04-14

## 2022-04-13 RX ORDER — ASPIRIN/CALCIUM CARB/MAGNESIUM 324 MG
81 TABLET ORAL ONCE
Refills: 0 | Status: COMPLETED | OUTPATIENT
Start: 2022-04-13 | End: 2022-04-13

## 2022-04-13 RX ORDER — NICARDIPINE HYDROCHLORIDE 30 MG/1
5 CAPSULE, EXTENDED RELEASE ORAL
Qty: 40 | Refills: 0 | Status: DISCONTINUED | OUTPATIENT
Start: 2022-04-13 | End: 2022-04-14

## 2022-04-13 RX ADMIN — ATORVASTATIN CALCIUM 40 MILLIGRAM(S): 80 TABLET, FILM COATED ORAL at 21:16

## 2022-04-13 RX ADMIN — Medication 50 MILLIGRAM(S): at 01:53

## 2022-04-13 RX ADMIN — Medication 2: at 11:30

## 2022-04-13 RX ADMIN — Medication 75 MILLIGRAM(S): at 06:03

## 2022-04-13 RX ADMIN — Medication 25 MICROGRAM(S): at 06:03

## 2022-04-13 RX ADMIN — Medication 1000 MILLIGRAM(S): at 18:46

## 2022-04-13 RX ADMIN — HEPARIN SODIUM 5000 UNIT(S): 5000 INJECTION INTRAVENOUS; SUBCUTANEOUS at 06:03

## 2022-04-13 RX ADMIN — PANTOPRAZOLE SODIUM 40 MILLIGRAM(S): 20 TABLET, DELAYED RELEASE ORAL at 11:29

## 2022-04-13 RX ADMIN — AMLODIPINE BESYLATE 10 MILLIGRAM(S): 2.5 TABLET ORAL at 06:04

## 2022-04-13 RX ADMIN — SODIUM CHLORIDE 3 MILLILITER(S): 9 INJECTION INTRAMUSCULAR; INTRAVENOUS; SUBCUTANEOUS at 06:04

## 2022-04-13 RX ADMIN — Medication 50 MILLIGRAM(S): at 06:04

## 2022-04-13 RX ADMIN — CHLORHEXIDINE GLUCONATE 1 APPLICATION(S): 213 SOLUTION TOPICAL at 06:04

## 2022-04-13 RX ADMIN — CHLORHEXIDINE GLUCONATE 12.5 MILLILITER(S): 213 SOLUTION TOPICAL at 06:12

## 2022-04-13 RX ADMIN — Medication 2: at 22:17

## 2022-04-13 RX ADMIN — NICARDIPINE HYDROCHLORIDE 25 MG/HR: 30 CAPSULE, EXTENDED RELEASE ORAL at 20:24

## 2022-04-13 RX ADMIN — CHLORHEXIDINE GLUCONATE 1 APPLICATION(S): 213 SOLUTION TOPICAL at 00:39

## 2022-04-13 RX ADMIN — HEPARIN SODIUM 5000 UNIT(S): 5000 INJECTION INTRAVENOUS; SUBCUTANEOUS at 21:16

## 2022-04-13 RX ADMIN — Medication 81 MILLIGRAM(S): at 07:30

## 2022-04-13 NOTE — BRIEF OPERATIVE NOTE - OPERATION/FINDINGS
TAVR 23mm antonia  b/l arterial access perclosed and angioseal  right groin venous access manual pressure

## 2022-04-14 ENCOUNTER — TRANSCRIPTION ENCOUNTER (OUTPATIENT)
Age: 77
End: 2022-04-14

## 2022-04-14 VITALS
HEART RATE: 76 BPM | DIASTOLIC BLOOD PRESSURE: 77 MMHG | OXYGEN SATURATION: 97 % | RESPIRATION RATE: 16 BRPM | SYSTOLIC BLOOD PRESSURE: 173 MMHG

## 2022-04-14 PROBLEM — N18.4 CHRONIC KIDNEY DISEASE, STAGE 4 (SEVERE): Chronic | Status: ACTIVE | Noted: 2022-04-12

## 2022-04-14 PROBLEM — I35.0 NONRHEUMATIC AORTIC (VALVE) STENOSIS: Chronic | Status: ACTIVE | Noted: 2022-04-12

## 2022-04-14 PROBLEM — I50.32 CHRONIC DIASTOLIC (CONGESTIVE) HEART FAILURE: Chronic | Status: ACTIVE | Noted: 2022-04-12

## 2022-04-14 LAB
ALBUMIN SERPL ELPH-MCNC: 3.5 G/DL — SIGNIFICANT CHANGE UP (ref 3.3–5)
ALP SERPL-CCNC: 161 U/L — HIGH (ref 40–120)
ALT FLD-CCNC: 14 U/L — SIGNIFICANT CHANGE UP (ref 10–45)
ANION GAP SERPL CALC-SCNC: 13 MMOL/L — SIGNIFICANT CHANGE UP (ref 5–17)
APTT BLD: 38.4 SEC — HIGH (ref 27.5–35.5)
AST SERPL-CCNC: 27 U/L — SIGNIFICANT CHANGE UP (ref 10–40)
BILIRUB SERPL-MCNC: 0.5 MG/DL — SIGNIFICANT CHANGE UP (ref 0.2–1.2)
BUN SERPL-MCNC: 36 MG/DL — HIGH (ref 7–23)
CALCIUM SERPL-MCNC: 8.6 MG/DL — SIGNIFICANT CHANGE UP (ref 8.4–10.5)
CHLORIDE SERPL-SCNC: 97 MMOL/L — SIGNIFICANT CHANGE UP (ref 96–108)
CO2 SERPL-SCNC: 25 MMOL/L — SIGNIFICANT CHANGE UP (ref 22–31)
CREAT SERPL-MCNC: 2.82 MG/DL — HIGH (ref 0.5–1.3)
EGFR: 23 ML/MIN/1.73M2 — LOW
GAS PNL BLDA: SIGNIFICANT CHANGE UP
GLUCOSE BLDC GLUCOMTR-MCNC: 115 MG/DL — HIGH (ref 70–99)
GLUCOSE BLDC GLUCOMTR-MCNC: 124 MG/DL — HIGH (ref 70–99)
GLUCOSE SERPL-MCNC: 116 MG/DL — HIGH (ref 70–99)
HCT VFR BLD CALC: 39.2 % — SIGNIFICANT CHANGE UP (ref 39–50)
HGB BLD-MCNC: 12.7 G/DL — LOW (ref 13–17)
INR BLD: 1.08 — SIGNIFICANT CHANGE UP (ref 0.88–1.16)
MAGNESIUM SERPL-MCNC: 1.8 MG/DL — SIGNIFICANT CHANGE UP (ref 1.6–2.6)
MCHC RBC-ENTMCNC: 32.4 GM/DL — SIGNIFICANT CHANGE UP (ref 32–36)
MCHC RBC-ENTMCNC: 33.2 PG — SIGNIFICANT CHANGE UP (ref 27–34)
MCV RBC AUTO: 102.6 FL — HIGH (ref 80–100)
NRBC # BLD: 0 /100 WBCS — SIGNIFICANT CHANGE UP (ref 0–0)
PHOSPHATE SERPL-MCNC: 4.3 MG/DL — SIGNIFICANT CHANGE UP (ref 2.5–4.5)
PLATELET # BLD AUTO: 115 K/UL — LOW (ref 150–400)
POTASSIUM SERPL-MCNC: 4.8 MMOL/L — SIGNIFICANT CHANGE UP (ref 3.5–5.3)
POTASSIUM SERPL-SCNC: 4.8 MMOL/L — SIGNIFICANT CHANGE UP (ref 3.5–5.3)
PROT SERPL-MCNC: 5.7 G/DL — LOW (ref 6–8.3)
PROTHROM AB SERPL-ACNC: 12.9 SEC — SIGNIFICANT CHANGE UP (ref 10.5–13.4)
RBC # BLD: 3.82 M/UL — LOW (ref 4.2–5.8)
RBC # FLD: 13.2 % — SIGNIFICANT CHANGE UP (ref 10.3–14.5)
SODIUM SERPL-SCNC: 135 MMOL/L — SIGNIFICANT CHANGE UP (ref 135–145)
WBC # BLD: 6.03 K/UL — SIGNIFICANT CHANGE UP (ref 3.8–10.5)
WBC # FLD AUTO: 6.03 K/UL — SIGNIFICANT CHANGE UP (ref 3.8–10.5)

## 2022-04-14 PROCEDURE — 90935 HEMODIALYSIS ONE EVALUATION: CPT

## 2022-04-14 PROCEDURE — 93308 TTE F-UP OR LMTD: CPT | Mod: 26

## 2022-04-14 PROCEDURE — 71045 X-RAY EXAM CHEST 1 VIEW: CPT | Mod: 26

## 2022-04-14 RX ORDER — ACETAMINOPHEN 500 MG
2 TABLET ORAL
Qty: 56 | Refills: 0
Start: 2022-04-14 | End: 2022-04-20

## 2022-04-14 RX ORDER — HYDRALAZINE HCL 50 MG
3 TABLET ORAL
Qty: 0 | Refills: 0 | DISCHARGE

## 2022-04-14 RX ORDER — METOPROLOL TARTRATE 50 MG
1 TABLET ORAL
Qty: 60 | Refills: 0
Start: 2022-04-14 | End: 2022-05-13

## 2022-04-14 RX ADMIN — HEPARIN SODIUM 5000 UNIT(S): 5000 INJECTION INTRAVENOUS; SUBCUTANEOUS at 05:18

## 2022-04-14 RX ADMIN — Medication 25 MICROGRAM(S): at 05:17

## 2022-04-14 RX ADMIN — Medication 81 MILLIGRAM(S): at 15:37

## 2022-04-14 RX ADMIN — Medication 1000 MILLIGRAM(S): at 05:19

## 2022-04-14 NOTE — DISCHARGE NOTE PROVIDER - NSDCCPTREATMENT_GEN_ALL_CORE_FT
PRINCIPAL PROCEDURE  Procedure: TAVR, percutaneous  Findings and Treatment: TAVR antonia 23mm valve

## 2022-04-14 NOTE — DISCHARGE NOTE PROVIDER - NSDCFUADDAPPT_GEN_ALL_CORE_FT
-You will follow up with Dr. Johnson in about 1 week  -our office will make an appointment for your  -if you do not hear from us by this afternoon please call us a [phone]

## 2022-04-14 NOTE — DISCHARGE NOTE NURSING/CASE MANAGEMENT/SOCIAL WORK - NSDCFUADDAPPT_GEN_ALL_CORE_FT
-You will follow up with Dr. Johnson in about 1 week  -our office will make an appointment for your  -if you do not hear from us by this afternoon please call us a [phone]

## 2022-04-14 NOTE — DISCHARGE NOTE PROVIDER - NSDCMRMEDTOKEN_GEN_ALL_CORE_FT
acetaminophen 325 mg oral tablet: 2 tab(s) orally every 6 hours, As needed, Mild Pain (1 - 3) MDD:8  allopurinol 100 mg oral tablet: 1 tab(s) orally once a day  amLODIPine 10 mg oral tablet: 1 tab(s) orally once a day  aspirin 81 mg oral delayed release tablet: 1 tab(s) orally once a day  atorvastatin 40 mg oral tablet: 1 tab(s) orally once a day (at bedtime)  cholecalciferol 1000 intl units (25 mcg) oral tablet: 1 tab(s) orally once a week  levothyroxine 25 mcg (0.025 mg) oral tablet: 1 tab(s) orally once a day  metoprolol tartrate 25 mg oral tablet: 1 tab(s) orally 2 times a day  torsemide 100 mg oral tablet: 1 tab(s) orally once a day on NON-HD DAYS  Tresiba 100 units/mL subcutaneous solution: 12 unit(s) subcutaneous once a day

## 2022-04-14 NOTE — DISCHARGE NOTE PROVIDER - CARE PROVIDER_API CALL
Willie Johnson)  Cardiology; Interventional Cardiology  [address] [place] [address]  Phone: [phone]  Fax: [phone]  Follow Up Time: 1 week    [facility]  [address] [place] [address]  Phone: [phone]  Fax: [phone]  Follow Up Time: 2 weeks

## 2022-04-14 NOTE — PROGRESS NOTE ADULT - ASSESSMENT
77 y/o M with PMHx HTN, HLD, IDDM, carotid artery stenosis s/p carotid stents, PAD, anaemia, hypothyroid, CAD, chronic dCHF, and severe AS. Hx of ESRD on HD TTS @[facility] in [place] who presents for TAVR. S/p TAVR 4/13. Consult for hemodialysis.   ESRD on HD TTS @[facility] in [place]  HD today 4/14 per schedule   hyperkalemia: Lokelma 10g PO once   EDW 66kg ?   BP: hypertensive, UF with HD  BMD: not on phos binders  Access: R TDC, LUE AVF (created 2 wks prior per pt)  Daily weights, strict I&Os, renally dose meds, renal diet    Thank you for the opportunity to participate in the care of your patient. The nephrology service remains available to assist with any questions or concerns. Please feel free to reach us by paging the on-call nephrology fellow for urgent issues or as below.     Paxton Young M.D.   PGY-5, Nephrology Fellow   C: [phone]   P: [phone]   
77 y/o M with PMHx HTN, HLD, IDDM, carotid artery stenosis s/p carotid stents, PAD, anaemia, hypothyroid, CAD, chronic dCHF, and severe AS. Hx of ESRD on HD TTS @[facility] in [place] who presents for TAVR. S/p TAVR 4/13. Consult for hemodialysis.   ESRD on HD TTS @[facility] in [place]  HD tomorrow 4/14 per schedule   hyperkalemia: Lokelma 10g PO once   EDW 66kg ?   BP: hypertensive, UF with HD  BMD: check phos; not on phos binders  Access: R TDC, LUE AVF (created 2 wks prior per pt)  Daily weights, strict I&Os, renally dose meds, renal diet    Thank you for the opportunity to participate in the care of your patient. The nephrology service remains available to assist with any questions or concerns. Please feel free to reach us by paging the on-call nephrology fellow for urgent issues or as below.     Paxton Young M.D.   PGY-5, Nephrology Fellow   C: [phone]   P: [phone]

## 2022-04-14 NOTE — DISCHARGE NOTE PROVIDER - PROVIDER TOKENS
PROVIDER:[TOKEN:[[id number]],FOLLOWUP:[1 week]],PROVIDER:[TOKEN:[[id number]],FOLLOWUP:[2 weeks]]

## 2022-04-14 NOTE — DISCHARGE NOTE NURSING/CASE MANAGEMENT/SOCIAL WORK - NSDCPEFALRISK_GEN_ALL_CORE
For information on Fall & Injury Prevention, visit: [email or web address] OR  [email or web address] OR  https://www.cdc.gov/steadi/patient.html

## 2022-04-14 NOTE — DISCHARGE NOTE PROVIDER - HOSPITAL COURSE
77 y/o Turkish speaking male with PMHx HTN, HLD, IDDM (on Tresiba) CKD stage IV (on HD via right chest permacath), carotid artery stenosis s/p bilateral carotid stents (found after patient had TIA 3 years ago), anemia, hypothyroidism, CAD (s/p previous PCI per medical records, patient cannot recall), chronic diastolic CHF, and severe AS s/p BAV is now pre-op for TAVR on 4/13/22 with Dr. Cole. He denies any chest pain, syncopal episodes, lower extremity edema. Patient admitted for pre-op optimization with hemodialysis and possible PRBC transfusion.

## 2022-04-14 NOTE — DISCHARGE NOTE NURSING/CASE MANAGEMENT/SOCIAL WORK - PATIENT PORTAL LINK FT
You can access the FollowMyHealth Patient Portal offered by [facility] by registering at the following website: [email or web address]. By joining [employer]’s FollowMyHealth portal, you will also be able to view your health information using other applications (apps) compatible with our system.

## 2022-04-14 NOTE — PROGRESS NOTE ADULT - SUBJECTIVE AND OBJECTIVE BOX
Patient is a 76y Male seen and evaluated at bedside.   s/p TAVR today   K 5.4 noted   for hemodialysis tomorrow     Meds:    ceFAZolin  Injectable. 1000 every 12 hours  dextrose 5%. 1000 <Continuous>  dextrose 5%. 1000 <Continuous>  dextrose 50% Injectable 25 once  dextrose 50% Injectable 12.5 once  dextrose 50% Injectable 25 once  dextrose Oral Gel 15 once PRN  glucagon  Injectable 1 once  heparin   Injectable 5000 every 8 hours  insulin lispro (ADMELOG) corrective regimen sliding scale  Before meals and at bedtime  levothyroxine 25 daily  pantoprazole  Injectable 40 daily  sodium chloride 0.9%. 1000 <Continuous>      T(C): , Max: 37.2 (22 @ 04:30)  T(F): , Max: 99 (22 @ 04:30)  HR: 52 (22 @ 12:00)  BP: 144/67 (22 @ 06:16)  BP(mean): 96 (22 @ 04:40)  RR: 18 (22 @ 12:00)  SpO2: 99% (22 @ 12:00)  Wt(kg): --     @ 07:01  -   @ 07:00  --------------------------------------------------------  IN: 580 mL / OUT: 2675 mL / NET: -2095 mL     @ 07:01  -   @ 12:09  --------------------------------------------------------  IN: 0 mL / OUT: 100 mL / NET: -100 mL      Height (cm): 165.1 ( 06:16)  Weight (kg): 66.3 ( 06:16)  BMI (kg/m2): 24.3 ( 06:16)  BSA (m2): 1.73 ( 06:16)    Review of Systems:  10 point ROS negative except as above     PHYSICAL EXAM:  GENERAL: Alert, awake, oriented x3 on RA in NAD  CHEST/LUNG: Bilateral clear breath sounds, no rales  HEART: Regular rate and rhythm, no murmur  ABDOMEN: Soft, nontender, non distended  EXTREMITIES: No pedal edema  ACCESS: LUE AVF- immature; +R TDC c/d/i non-tender       LABS:                        13.5   6.99  )-----------( 134      ( 2022 09:53 )             41.7         136  |  101  |  54<H>  ----------------------------<  184<H>  5.4<H>   |  24  |  3.08<H>    Ca    8.6      2022 09:53  Phos  4.0       Mg     1.8         TPro  6.3  /  Alb  3.9  /  TBili  0.4  /  DBili  x   /  AST  29  /  ALT  23  /  AlkPhos  158<H>      Hepatitis C Virus S/CO Ratio: 0.04 S/CO ( @ 18:34)  Hepatitis C Virus Interpretation: Nonreact ( @ 18:34)    PT/INR - ( 2022 09:53 )   PT: 13.2 sec;   INR: 1.11          PTT - ( 2022 09:53 )  PTT:36.8 sec  Urinalysis Basic - ( 2022 23:08 )    Color: Yellow / Appearance: Clear / S.020 / pH: x  Gluc: x / Ketone: NEGATIVE  / Bili: Negative / Urobili: 0.2 E.U./dL   Blood: x / Protein: 100 mg/dL / Nitrite: NEGATIVE   Leuk Esterase: NEGATIVE / RBC: < 5 /HPF / WBC < 5 /HPF   Sq Epi: x / Non Sq Epi: 0-5 /HPF / Bacteria: None /HPF            RADIOLOGY & ADDITIONAL STUDIES:            
CTICU  CRITICAL  CARE  attending     Hand off received 					   Pertinent clinical, laboratory, radiographic, hemodynamic, echocardiographic, respiratory data, microbiologic data and chart were reviewed and analyzed frequently throughout the course of the day and night  Patient seen and examined with CTS/ SH attending at bedside  Pt is a 76y , Male, HEALTH ISSUES - PROBLEM Dx:      , FAMILY HISTORY:  FH: diabetes mellitus    PAST MEDICAL & SURGICAL HISTORY:  Hypertension    Hyperlipidemia    DM (diabetes mellitus)    H/O carotid artery stenosis    VISHNU (iron deficiency anemia)    Aortic stenosis    Stage 4 chronic kidney disease    Chronic diastolic congestive heart failure    H/O carotid artery stenosis  s/p carotid stent x 2 in 2019    S/P balloon aortic valvuloplasty      Patient is a 76y old  Male who presents with a chief complaint of Severe aortic stenosis (2022 12:09)      14 system review limited by mentation and multiorgan morbidity     Vital signs, hemodynamic and respiratory parameters were reviewed from the bedside nursing flowsheet.  ICU Vital Signs Last 24 Hrs  T(C): 36.5 (2022 17:06), Max: 37.2 (2022 04:30)  T(F): 97.7 (2022 17:06), Max: 99 (2022 04:30)  HR: 63 (2022 17:00) (51 - 72)  BP: 144/67 (2022 06:16) (144/67 - 198/82)  BP(mean): 96 (2022 04:40) (96 - 116)  ABP: 149/51 (2022 17:00) (122/42 - 151/48)  ABP(mean): 85 (2022 17:00) (68 - 85)  RR: 16 (2022 17:00) (16 - 20)  SpO2: 99% (2022 17:00) (94% - 99%)    Adult Advanced Hemodynamics Last 24 Hrs  CVP(mm Hg): --  CVP(cm H2O): --  CO: --  CI: --  PA: --  PA(mean): --  PCWP: --  SVR: --  SVRI: --  PVR: --  PVRI: --, ABG - ( 2022 09:46 )  pH, Arterial: 7.36  pH, Blood: x     /  pCO2: 46    /  pO2: 116   / HCO3: 26    / Base Excess: 0.1   /  SaO2: 98.3                Intake and output was reviewed and the fluid balance was calculated  Daily Height in cm: 165.1 (2022 06:16)    Daily Weight in k.8 (2022 14:40)  I&O's Summary    2022 07:01  -  2022 07:00  --------------------------------------------------------  IN: 580 mL / OUT: 2675 mL / NET: -2095 mL    2022 07:01  -  2022 17:42  --------------------------------------------------------  IN: 240 mL / OUT: 100 mL / NET: 140 mL        All lines and drain sites were assessed  [facility]      POCT Blood Glucose.: 167 mg/dL (2022 11:23)    No acute change in focality  Auscultation of the chest reveals equal bs  Abdomen is soft  Extremities are warm and well perfused  Wounds appear clean and unremarkable  Antibiotics are periop    labs  CBC Full  -  ( 2022 09:53 )  WBC Count : 6.99 K/uL  RBC Count : 4.13 M/uL  Hemoglobin : 13.5 g/dL  Hematocrit : 41.7 %  Platelet Count - Automated : 134 K/uL  Mean Cell Volume : 101.0 fl  Mean Cell Hemoglobin : 32.7 pg  Mean Cell Hemoglobin Concentration : 32.4 gm/dL  Auto Neutrophil # : 6.20 K/uL  Auto Lymphocyte # : 0.18 K/uL  Auto Monocyte # : 0.36 K/uL  Auto Eosinophil # : 0.18 K/uL  Auto Basophil # : 0.06 K/uL  Auto Neutrophil % : 87.8 %  Auto Lymphocyte % : 2.6 %  Auto Monocyte % : 5.2 %  Auto Eosinophil % : 2.6 %  Auto Basophil % : 0.9 %    13    136  |  101  |  54<H>  ----------------------------<  184<H>  5.4<H>   |  24  |  3.08<H>    Ca    8.6      2022 09:53  Phos  4.0     -  Mg     1.8         TPro  6.3  /  Alb  3.9  /  TBili  0.4  /  DBili  x   /  AST  29  /  ALT  23  /  AlkPhos  158<H>  -13    PT/INR - ( 2022 09:53 )   PT: 13.2 sec;   INR: 1.11          PTT - ( 2022 09:53 )  PTT:36.8 sec  The current medications were reviewed   MEDICATIONS  (STANDING):  atorvastatin 40 milliGRAM(s) Oral at bedtime  ceFAZolin  Injectable. 1000 milliGRAM(s) IV Push every 12 hours  dextrose 5%. 1000 milliLiter(s) (100 mL/Hr) IV Continuous <Continuous>  dextrose 5%. 1000 milliLiter(s) (50 mL/Hr) IV Continuous <Continuous>  dextrose 50% Injectable 25 Gram(s) IV Push once  dextrose 50% Injectable 12.5 Gram(s) IV Push once  dextrose 50% Injectable 25 Gram(s) IV Push once  glucagon  Injectable 1 milliGRAM(s) IntraMuscular once  heparin   Injectable 5000 Unit(s) SubCutaneous every 8 hours  insulin lispro (ADMELOG) corrective regimen sliding scale   SubCutaneous Before meals and at bedtime  levothyroxine 25 MICROGram(s) Oral daily  sodium chloride 0.9%. 1000 milliLiter(s) (10 mL/Hr) IV Continuous <Continuous>    MEDICATIONS  (PRN):  dextrose Oral Gel 15 Gram(s) Oral once PRN Blood Glucose LESS THAN 70 milliGRAM(s)/deciliter       PROBLEM LIST/ ASSESSMENT:  HEALTH ISSUES - PROBLEM Dx:      ,   Patient is a 76y old  Male who presents with a chief complaint of Severe aortic stenosis (2022 12:09)     s/p tavr              My plan includes :  close hemodynamic, ventilatory and drain monitoring and management per post op routine    Monitor for arrhythmias and monitor parameters for organ perfusion  beta blockade not administered due to hemodynamic instability and bradycardia  monitor neurologic status  Head of the bed should remain elevated to 45 deg .   chest PT and IS will be encouraged  monitor adequacy of oxygenation and ventilation and attempt to wean oxygen  antibiotic regimen will be tailored to the clinical, laboratory and microbiologic data  Nutritional goals will be met using po eventually , ensure adequate caloric intake and montior the same  Stress ulcer and VTE prophylaxis will be achieved    Glycemic control is satisfactory  Electrolytes have been repleted as necessary and wound care has been carried out. Pain control has been achieved.   agressive physical therapy and early mobility and ambulation goals will be met   The family was updated about the course and plan  CRITICAL CARE TIME personally provided by me  in evaluation and management, reassessments, review and interpretation of labs and x-rays, ventilator and hemodynamic management, formulating a plan and coordinating care: ___90____ MIN.  Time does not include procedural time. Time spent was non routine post-operarive caRE and included multiple and repeated evaluations at the bedside  CTICU ATTENDING     					    Wilfrid Owens MD                        	
Patient is a 76y Male seen and evaluated at bedside.   s/p TAVR y'day   s/p hemodialysis yesterday   for hemodialysis today and discharge       Meds:    acetaminophen     Tablet .. 650 every 6 hours PRN  aspirin enteric coated 81 daily  atorvastatin 40 at bedtime  dextrose 5%. 1000 <Continuous>  dextrose 5%. 1000 <Continuous>  dextrose 50% Injectable 25 once  dextrose 50% Injectable 12.5 once  dextrose 50% Injectable 25 once  dextrose Oral Gel 15 once PRN  glucagon  Injectable 1 once  heparin   Injectable 5000 every 8 hours  insulin lispro (ADMELOG) corrective regimen sliding scale  Before meals and at bedtime  levothyroxine 25 daily  niCARdipine Infusion 5 <Continuous>  sodium chloride 0.9%. 1000 <Continuous>      T(C): , Max: 36.8 (22 @ 22:36)  T(F): , Max: 98.2 (22 @ 22:36)  HR: 72 (22 @ 14:00)  BP: 143/69 (22 @ 13:00)  BP(mean): 100 (22 @ 13:00)  RR: 16 (22 @ 14:00)  SpO2: 97% (22 @ 14:00)  Wt(kg): --     @ 07:01  -   @ 07:00  --------------------------------------------------------  IN: 905 mL / OUT: 1950 mL / NET: -1045 mL     @ 07:01  -   @ 14:47  --------------------------------------------------------  IN: 120 mL / OUT: 0 mL / NET: 120 mL          Review of Systems:  10 point ROS negative except as above     PHYSICAL EXAM:  GENERAL: Alert, awake, oriented x3 on RA in NAD  CHEST/LUNG: Bilateral clear breath sounds, no rales  HEART: Regular rate and rhythm, no murmur  ABDOMEN: Soft, nontender, non distended  EXTREMITIES: No pedal edema  ACCESS: LUE AVF- immature; +R TDC c/d/i non-tender         LABS:                        12.7   6.03  )-----------( 115      ( 2022 03:27 )             39.2     04-14    135  |  97  |  36<H>  ----------------------------<  116<H>  4.8   |  25  |  2.82<H>    Ca    8.6      2022 03:27  Phos  4.3     04-14  Mg     1.8     04-14    TPro  5.7<L>  /  Alb  3.5  /  TBili  0.5  /  DBili  x   /  AST  27  /  ALT  14  /  AlkPhos  161<H>  04-14      PT/INR - ( 2022 03:27 )   PT: 12.9 sec;   INR: 1.08          PTT - ( 2022 03:27 )  PTT:38.4 sec  Urinalysis Basic - ( 2022 23:08 )    Color: Yellow / Appearance: Clear / S.020 / pH: x  Gluc: x / Ketone: NEGATIVE  / Bili: Negative / Urobili: 0.2 E.U./dL   Blood: x / Protein: 100 mg/dL / Nitrite: NEGATIVE   Leuk Esterase: NEGATIVE / RBC: < 5 /HPF / WBC < 5 /HPF   Sq Epi: x / Non Sq Epi: 0-5 /HPF / Bacteria: None /HPF            RADIOLOGY & ADDITIONAL STUDIES:

## 2022-04-14 NOTE — DISCHARGE NOTE PROVIDER - CARE PROVIDERS DIRECT ADDRESSES
,[email or web address]

## 2022-04-15 RX ORDER — HYDRALAZINE HYDROCHLORIDE 50 MG/1
50 TABLET ORAL
Refills: 0 | Status: DISCONTINUED | COMMUNITY
End: 2022-04-15

## 2022-04-15 RX ORDER — INSULIN DEGLUDEC INJECTION 100 U/ML
100 INJECTION, SOLUTION SUBCUTANEOUS DAILY
Refills: 0 | Status: ACTIVE | COMMUNITY

## 2022-04-15 RX ORDER — PANTOPRAZOLE 40 MG/1
40 TABLET, DELAYED RELEASE ORAL
Refills: 0 | Status: ACTIVE | COMMUNITY

## 2022-04-15 RX ORDER — ACETAMINOPHEN 325 MG/1
325 TABLET ORAL
Refills: 0 | Status: ACTIVE | COMMUNITY

## 2022-04-18 ENCOUNTER — APPOINTMENT (OUTPATIENT)
Dept: CARE COORDINATION | Facility: HOME HEALTH | Age: 77
End: 2022-04-18

## 2022-04-18 VITALS — WEIGHT: 145.6 LBS | BODY MASS INDEX: 24.23 KG/M2

## 2022-04-18 DIAGNOSIS — Z95.2 PRESENCE OF PROSTHETIC HEART VALVE: ICD-10-CM

## 2022-04-18 DIAGNOSIS — I35.0 NONRHEUMATIC AORTIC (VALVE) STENOSIS: ICD-10-CM

## 2022-04-18 RX ORDER — DORZOLAMIDE HYDROCHLORIDE TIMOLOL MALEATE 20; 5 MG/ML; MG/ML
22.3-6.8 SOLUTION/ DROPS OPHTHALMIC
Qty: 10 | Refills: 0 | Status: ACTIVE | COMMUNITY
Start: 2022-04-06

## 2022-04-18 NOTE — HISTORY OF PRESENT ILLNESS
[Home] : at home, [unfilled] , at the time of the visit. [Other Location: e.g. Home (Enter Location, City,State)___] : at [unfilled] [Verbal consent obtained from patient] : the patient, [unfilled] [FreeTextEntry1] : \par This pt. is enrolled in the [facility] Your [facility] (FYH) program through [employer]. Call made to Mr. COLE, a pt. of Dr. Johnson s/p TAVR on 4/13. Discharged home from [facility] on 4/14.\par \par Pt. called for post-hospitalization transitional care management and post-surgical follow-up. Pt. unable to participate in telehealth at this time. Pt has all medications per DC instructions and is taking them as prescribed. Reports hourly IS use pulling in greater than 1000 cc. Last HD session was on Saturday - next session is scheduled for tomorrow morning. Denies fever, SOB, CP, palpitations or BLE edema. VN saw pt over the weekend with no issues/concerns.

## 2022-04-18 NOTE — ASSESSMENT
[FreeTextEntry1] : Pt. is a 75 y/o male with PMHx HTN, HLD, IDDM (on Tresiba) CKD stage IV (on HD via right chest permcath), carotid artery stenosis s/p bilateral carotid stents (found after patient had TIA 3 years ago), anemia, hypothyroidism, CAD (s/p previous PCI per medical records, patient cannot recall), chronic diastolic CHF, and severe AS s/p BAV. Pt. underwent TAVR w/ Dr. Cole on 4/13. Postop course unremarkable. Pt. discharged home w/ [facility].\par \par Pt. now recovering well s/p TAVR.

## 2022-04-18 NOTE — REVIEW OF SYSTEMS
[Fever] : no fever [Feeling Poorly] : not feeling poorly [Heart Rate Is Slow] : the heart rate was not slow [Heart Rate Is Fast] : the heart rate was not fast [Chest Pain] : no chest pain [Palpitations] : no palpitations [Lower Ext Edema] : no extremity edema [Shortness Of Breath] : no shortness of breath [Cough] : no cough [SOB on Exertion] : no shortness of breath during exertion [Abdominal Pain] : no abdominal pain [Constipation] : no constipation [Dizziness] : no dizziness [de-identified] : B/L groins soft without bleeding

## 2022-04-18 NOTE — PHYSICAL EXAM
[] : no respiratory distress [Oriented To Time, Place, And Person] : oriented to person, place, and time [Affect] : the affect was normal

## 2022-04-20 DIAGNOSIS — D63.1 ANEMIA IN CHRONIC KIDNEY DISEASE: ICD-10-CM

## 2022-04-20 DIAGNOSIS — I13.2 HYPERTENSIVE HEART AND CHRONIC KIDNEY DISEASE WITH HEART FAILURE AND WITH STAGE 5 CHRONIC KIDNEY DISEASE, OR END STAGE RENAL DISEASE: ICD-10-CM

## 2022-04-20 DIAGNOSIS — Z79.4 LONG TERM (CURRENT) USE OF INSULIN: ICD-10-CM

## 2022-04-20 DIAGNOSIS — I25.10 ATHEROSCLEROTIC HEART DISEASE OF NATIVE CORONARY ARTERY WITHOUT ANGINA PECTORIS: ICD-10-CM

## 2022-04-20 DIAGNOSIS — E11.51 TYPE 2 DIABETES MELLITUS WITH DIABETIC PERIPHERAL ANGIOPATHY WITHOUT GANGRENE: ICD-10-CM

## 2022-04-20 DIAGNOSIS — I35.0 NONRHEUMATIC AORTIC (VALVE) STENOSIS: ICD-10-CM

## 2022-04-20 DIAGNOSIS — E03.9 HYPOTHYROIDISM, UNSPECIFIED: ICD-10-CM

## 2022-04-20 DIAGNOSIS — Z79.82 LONG TERM (CURRENT) USE OF ASPIRIN: ICD-10-CM

## 2022-04-20 DIAGNOSIS — I65.23 OCCLUSION AND STENOSIS OF BILATERAL CAROTID ARTERIES: ICD-10-CM

## 2022-04-20 DIAGNOSIS — Z86.73 PERSONAL HISTORY OF TRANSIENT ISCHEMIC ATTACK (TIA), AND CEREBRAL INFARCTION WITHOUT RESIDUAL DEFICITS: ICD-10-CM

## 2022-04-20 DIAGNOSIS — N18.6 END STAGE RENAL DISEASE: ICD-10-CM

## 2022-04-20 DIAGNOSIS — I16.0 HYPERTENSIVE URGENCY: ICD-10-CM

## 2022-04-20 DIAGNOSIS — Z00.6 ENCOUNTER FOR EXAMINATION FOR NORMAL COMPARISON AND CONTROL IN CLINICAL RESEARCH PROGRAM: ICD-10-CM

## 2022-04-20 DIAGNOSIS — E87.5 HYPERKALEMIA: ICD-10-CM

## 2022-04-20 DIAGNOSIS — Z99.2 DEPENDENCE ON RENAL DIALYSIS: ICD-10-CM

## 2022-04-20 DIAGNOSIS — E78.5 HYPERLIPIDEMIA, UNSPECIFIED: ICD-10-CM

## 2022-04-20 DIAGNOSIS — I50.32 CHRONIC DIASTOLIC (CONGESTIVE) HEART FAILURE: ICD-10-CM

## 2022-04-20 DIAGNOSIS — E11.22 TYPE 2 DIABETES MELLITUS WITH DIABETIC CHRONIC KIDNEY DISEASE: ICD-10-CM

## 2022-04-25 ENCOUNTER — APPOINTMENT (OUTPATIENT)
Dept: CARDIOTHORACIC SURGERY | Facility: CLINIC | Age: 77
End: 2022-04-25

## 2022-04-26 PROCEDURE — C1887: CPT

## 2022-04-26 PROCEDURE — 84484 ASSAY OF TROPONIN QUANT: CPT

## 2022-04-26 PROCEDURE — 85027 COMPLETE CBC AUTOMATED: CPT

## 2022-04-26 PROCEDURE — 82553 CREATINE MB FRACTION: CPT

## 2022-04-26 PROCEDURE — 82947 ASSAY GLUCOSE BLOOD QUANT: CPT

## 2022-04-26 PROCEDURE — 71045 X-RAY EXAM CHEST 1 VIEW: CPT

## 2022-04-26 PROCEDURE — 36415 COLL VENOUS BLD VENIPUNCTURE: CPT

## 2022-04-26 PROCEDURE — 87340 HEPATITIS B SURFACE AG IA: CPT

## 2022-04-26 PROCEDURE — 84439 ASSAY OF FREE THYROXINE: CPT

## 2022-04-26 PROCEDURE — 83036 HEMOGLOBIN GLYCOSYLATED A1C: CPT

## 2022-04-26 PROCEDURE — 83735 ASSAY OF MAGNESIUM: CPT

## 2022-04-26 PROCEDURE — 84100 ASSAY OF PHOSPHORUS: CPT

## 2022-04-26 PROCEDURE — 86901 BLOOD TYPING SEROLOGIC RH(D): CPT

## 2022-04-26 PROCEDURE — C1894: CPT

## 2022-04-26 PROCEDURE — 84443 ASSAY THYROID STIM HORMONE: CPT

## 2022-04-26 PROCEDURE — C1769: CPT

## 2022-04-26 PROCEDURE — L8699: CPT

## 2022-04-26 PROCEDURE — 82962 GLUCOSE BLOOD TEST: CPT

## 2022-04-26 PROCEDURE — 86850 RBC ANTIBODY SCREEN: CPT

## 2022-04-26 PROCEDURE — C1760: CPT

## 2022-04-26 PROCEDURE — C1889: CPT

## 2022-04-26 PROCEDURE — 85610 PROTHROMBIN TIME: CPT

## 2022-04-26 PROCEDURE — 86900 BLOOD TYPING SEROLOGIC ABO: CPT

## 2022-04-26 PROCEDURE — 82803 BLOOD GASES ANY COMBINATION: CPT

## 2022-04-26 PROCEDURE — 84295 ASSAY OF SERUM SODIUM: CPT

## 2022-04-26 PROCEDURE — 82330 ASSAY OF CALCIUM: CPT

## 2022-04-26 PROCEDURE — 80053 COMPREHEN METABOLIC PANEL: CPT

## 2022-04-26 PROCEDURE — 90935 HEMODIALYSIS ONE EVALUATION: CPT

## 2022-04-26 PROCEDURE — 86923 COMPATIBILITY TEST ELECTRIC: CPT

## 2022-04-26 PROCEDURE — 86706 HEP B SURFACE ANTIBODY: CPT

## 2022-04-26 PROCEDURE — 85014 HEMATOCRIT: CPT

## 2022-04-26 PROCEDURE — 93306 TTE W/DOPPLER COMPLETE: CPT

## 2022-04-26 PROCEDURE — 84132 ASSAY OF SERUM POTASSIUM: CPT

## 2022-04-26 PROCEDURE — 85730 THROMBOPLASTIN TIME PARTIAL: CPT

## 2022-04-26 PROCEDURE — 85025 COMPLETE CBC W/AUTO DIFF WBC: CPT

## 2022-04-26 PROCEDURE — 80061 LIPID PANEL: CPT

## 2022-04-26 PROCEDURE — 82550 ASSAY OF CK (CPK): CPT

## 2022-04-26 PROCEDURE — 83880 ASSAY OF NATRIURETIC PEPTIDE: CPT

## 2022-04-26 PROCEDURE — 81001 URINALYSIS AUTO W/SCOPE: CPT

## 2022-04-26 PROCEDURE — 86803 HEPATITIS C AB TEST: CPT

## 2022-04-28 NOTE — REASON FOR VISIT
[CV Risk Factors and Non-Cardiac Disease] : CV risk factors and non-cardiac disease [Structural Heart and Valve Disease] : structural heart and valve disease [Coronary Artery Disease] : coronary artery disease

## 2022-04-29 ENCOUNTER — NON-APPOINTMENT (OUTPATIENT)
Age: 77
End: 2022-04-29

## 2022-04-29 ENCOUNTER — APPOINTMENT (OUTPATIENT)
Dept: HEART AND VASCULAR | Facility: CLINIC | Age: 77
End: 2022-04-29
Payer: MEDICARE

## 2022-04-29 VITALS
HEART RATE: 68 BPM | DIASTOLIC BLOOD PRESSURE: 60 MMHG | SYSTOLIC BLOOD PRESSURE: 90 MMHG | HEIGHT: 65 IN | BODY MASS INDEX: 24.16 KG/M2 | WEIGHT: 145 LBS | TEMPERATURE: 98.7 F | OXYGEN SATURATION: 98 %

## 2022-04-29 PROCEDURE — 93000 ELECTROCARDIOGRAM COMPLETE: CPT

## 2022-04-29 PROCEDURE — 99214 OFFICE O/P EST MOD 30 MIN: CPT

## 2022-04-29 RX ORDER — METOPROLOL TARTRATE 50 MG/1
50 TABLET, FILM COATED ORAL
Refills: 0 | Status: ACTIVE | COMMUNITY

## 2022-04-29 NOTE — HISTORY OF PRESENT ILLNESS
[FreeTextEntry1] : 77 y/o Syrian/English speaking male with PMHx HTN, HLD, IDDM, ESRD on HD, carotid artery stenosis s/p carotid stents, PAD, anemia, hypothyroid, CAD, chronic dCHF, and severe AS s/p TAVR with a 23mm S3U on 4/13/2022 presenting for f/u. He was discharged home with no conduction disease (temporary LBBB intraop that resolved) with [facility]. Pt doing well, NYHA 1 symptoms. No angina.\par \par Imaging/Testing reviewed\par Cardiac cath 7/2021 reviewed: 2V CAD, 80% pRCA, 70% OM2 (small-average sized vessels)\par EKG reviewed today- NSR, narrow QRS\par TTE 4/2022- post-TAVR, no PVL. normal LVEF, +LVH

## 2022-04-29 NOTE — REVIEW OF SYSTEMS
[Fever] : no fever [Blurry Vision] : no blurred vision [Earache] : no earache [SOB] : no shortness of breath [Dyspnea on exertion] : not dyspnea during exertion [Chest Discomfort] : no chest discomfort [Cough] : no cough [Abdominal Pain] : no abdominal pain [Urinary Frequency] : no change in urinary frequency [Joint Pain] : no joint pain [Rash] : no rash [Dizziness] : no dizziness [Convulsions] : no convulsions [Confusion] : no confusion was observed [Easy Bleeding] : no tendency for easy bleeding

## 2022-04-29 NOTE — ASSESSMENT
[FreeTextEntry1] : 77 y/o Afghan/English speaking male with PMHx HTN, HLD, IDDM, ESRD on HD, carotid artery stenosis s/p carotid stents, PAD, anemia, hypothyroid, CAD, chronic dCHF, and severe AS s/p TAVR with a 23mm S3U on 4/13/2022 \par -BP higher on outpatient chart, instructed to stop norvasc if BP <100sbp consistently\par -ASA monotherapy\par -we discussed revascularization for his residual RCA/OM disease. Given that pt is asymptomatic, will continue medical management for now\par -F/u with me in 1 month\par -continue renal care with Dr. Gaffney

## 2023-01-30 NOTE — H&P ADULT - CARDIOVASCULAR DETAILS
Prescriptions electronically submitted to pharmacy from doctor's office
positive S1/positive S2/murmur

## 2023-02-01 ENCOUNTER — NON-APPOINTMENT (OUTPATIENT)
Age: 78
End: 2023-02-01

## 2023-02-02 ENCOUNTER — APPOINTMENT (OUTPATIENT)
Dept: VASCULAR SURGERY | Facility: CLINIC | Age: 78
End: 2023-02-02
Payer: MEDICARE

## 2023-02-02 VITALS — HEIGHT: 65 IN | BODY MASS INDEX: 23.99 KG/M2 | WEIGHT: 144 LBS

## 2023-02-02 DIAGNOSIS — Z99.2 END STAGE RENAL DISEASE: ICD-10-CM

## 2023-02-02 DIAGNOSIS — N18.6 END STAGE RENAL DISEASE: ICD-10-CM

## 2023-02-02 PROCEDURE — 99214 OFFICE O/P EST MOD 30 MIN: CPT

## 2023-02-02 PROCEDURE — 93971 EXTREMITY STUDY: CPT

## 2023-02-13 PROBLEM — N18.6 ESRD ON DIALYSIS: Status: ACTIVE | Noted: 2023-02-13

## 2023-02-16 PROBLEM — N18.6 ESRD ON DIALYSIS: Status: ACTIVE | Noted: 2023-02-16

## 2023-02-16 NOTE — ASSESSMENT
[FreeTextEntry1] : 77-year-old male, end-stage renal failure on dialysis, who underwent a left radiocephalic AV fistula approximately a year ago.  He had a diminutive vein and was originally planned to undergo AV graft, however he persisted with a fistula which he used for approximately 1 year.  Now he has had multiple thrombotic events and AV fistula is no longer salvageable.  We will perform vein mapping in the office which reveals an adequate vein in bilateral upper extremities.  I discussed with the patient and his daughter that he will require an AV graft, and we will attempt a left upper extremity angiogram.  Discussed count of the procedure, risk benefits, short and long-term outcomes.  They agreed to proceed.\par We will obtain preoperative clearance, and we will schedule left arm AV graft in expeditious fashion.

## 2023-02-16 NOTE — PHYSICAL EXAM
[Normal Breath Sounds] : Normal breath sounds [2+] : left 2+ [1+] : left 1+ [Alert] : alert [Oriented to Person] : oriented to person [Oriented to Place] : oriented to place [Calm] : calm [de-identified] : NAD [de-identified] : NCAT [de-identified] : supple [de-identified] : soft, nt, nd [de-identified] : Well-healed left wrist incision

## 2023-02-16 NOTE — HISTORY OF PRESENT ILLNESS
[FreeTextEntry1] : 76-year-old right-hand-dominant male here for evaluation for dialysis access.\par He has been followed by a couple nephrologist, most recently he was admitted at [facility] we have seen by Dr. Quick.\par He has a history of CKD with a former baseline creatinine of about 1.8 in January of this year.  He was admitted to [facility] with congestive heart failure and worsening edema in mid June, at which point he was diuresed, with an acute rise in his creatinine.  In addition, he underwent a CTA in preparation for a potential TAVR procedure.  This dye load was in early mid July, and his creatinine subsequently genna to approximately 8.\par He was admitted to [facility], where he was evaluated by Dr. Quick, who thought that his VERONICA was secondary to diuretics in addition to contrast nephropathy, and given his urine production as well as improvement in kidney function, he felt that his renal function could return to his precontrast baseline.  As such, dialysis access at that time was not recommended.\par \par The patient is right-hand dominant, he denies a history of upper arm edema or prior history of SVT or DVT.  He denies prior ports or chronic catheters.\par \par Interval hx:\par 12/17/21 - He is not yet on dialysis.  Has been on current doses of Lasix.  Most recent blood work from mid November reveals a creatinine of 3.58 with a GFR of 16.  Underwent upper extremity venous mapping. [de-identified] : 2/2/23 - He is status post left radiocephalic AV fistula creation, which required significant maturation.  He was able to utilize a fistula for approximately a year, and had thrombosed multiple times.  Maturation and declotting's were performed by Dr. Vickers.  He is now undergoing HD via right chest wall catheter.

## 2023-02-18 ENCOUNTER — RESULT REVIEW (OUTPATIENT)
Age: 78
End: 2023-02-18

## 2023-02-21 ENCOUNTER — APPOINTMENT (OUTPATIENT)
Dept: VASCULAR SURGERY | Facility: HOSPITAL | Age: 78
End: 2023-02-21

## 2023-02-21 ENCOUNTER — TRANSCRIPTION ENCOUNTER (OUTPATIENT)
Age: 78
End: 2023-02-21

## 2023-03-15 ENCOUNTER — APPOINTMENT (OUTPATIENT)
Dept: VASCULAR SURGERY | Facility: CLINIC | Age: 78
End: 2023-03-15
Payer: MEDICARE

## 2023-03-15 PROCEDURE — 99024 POSTOP FOLLOW-UP VISIT: CPT

## 2023-05-18 ENCOUNTER — APPOINTMENT (OUTPATIENT)
Dept: VASCULAR SURGERY | Facility: CLINIC | Age: 78
End: 2023-05-18

## 2023-09-03 NOTE — PRE-OP CHECKLIST - PATIENT SENT TO
Problem: Safety - Adult  Goal: Free from fall injury  Outcome: Progressing     Problem: ABCDS Injury Assessment  Goal: Absence of physical injury  Outcome: Progressing holding area

## 2025-05-16 ENCOUNTER — APPOINTMENT (OUTPATIENT)
Dept: VASCULAR SURGERY | Facility: CLINIC | Age: 80
End: 2025-05-16
Payer: SELF-PAY

## 2025-05-16 PROCEDURE — 99204 OFFICE O/P NEW MOD 45 MIN: CPT

## (undated) DEVICE — Device

## (undated) DEVICE — DRAPE OR CAMERA COVER

## (undated) DEVICE — SUT SILK 5-0 60" TIES

## (undated) DEVICE — ELCTR ZOLL DEFIBRILLATOR PAD NO REPLACEMENT

## (undated) DEVICE — RIGID ADULT SUCKER

## (undated) DEVICE — SUT ETHIBOND 3-0 36" RB-1

## (undated) DEVICE — SUT VICRYL 2-0 27" CT-1

## (undated) DEVICE — SUT VICRYL 4-0 18" PS-2 UNDYED

## (undated) DEVICE — TUBING SUCTION NONCONDUCTIVE 6MM X 12FT

## (undated) DEVICE — SYS DEL CONTROLLER ANGIOTOUCH

## (undated) DEVICE — TUBING EXTENSION HI PRESSURE FLEX 48"

## (undated) DEVICE — SUT STAINLESS STEEL 7 4-18" CCS

## (undated) DEVICE — SUT PROLENE 4-0 36" RB-1

## (undated) DEVICE — SUT SILK 2-0 18" SH (POP-OFF)

## (undated) DEVICE — SUT PLEDGET SOFT MEDIUM 1/4" X 1/8" X 1/16" X6

## (undated) DEVICE — FOLEY TRAY 16FR 5CC LF LUBRISIL ADVANCE TEMP CLOSED

## (undated) DEVICE — WARMING BLANKET FULL UNDERBODY

## (undated) DEVICE — SUT PROLENE 6-0 30" RB-2

## (undated) DEVICE — DRAPE PROBE COVER 5" X 96"

## (undated) DEVICE — SUT NUROLON 1 18" OS-8 (POP-OFF)

## (undated) DEVICE — SUT HOLDER INSERT FOR OCTOBASE STERNAL RETRACTOR

## (undated) DEVICE — SUT VICRYL 1 36" CTX UNDYED

## (undated) DEVICE — SUT VICRYL 0 27" CT

## (undated) DEVICE — SUT DOUBLE 6 WIRE STERNAL

## (undated) DEVICE — SYR MED A2000 SYRINGE KIT ACIST REUS

## (undated) DEVICE — SUT TICRON 2-0 36" CV-316 DA

## (undated) DEVICE — SUT PROLENE 5-0 24" RB-1

## (undated) DEVICE — SUMP INTRACARDIAC/PERICARDIAL 20FR 1/4" ADULT

## (undated) DEVICE — PREP SCRUB BRUSH W CHG 4%

## (undated) DEVICE — POSITIONER FOAM EGG CRATE ULNAR 2PCS (PINK)

## (undated) DEVICE — SUT PROLENE 3-0 36" SH-1

## (undated) DEVICE — DEVICE INFLATION DISPOSABLE

## (undated) DEVICE — PACING CABLE (BROWN) A/V TEMP SCREW DOWN 12FT

## (undated) DEVICE — NDL PERCU ECHOTIP 21G X 4CM

## (undated) DEVICE — SUT STAINLESS STEEL 6 4-18" CCS

## (undated) DEVICE — SUT PLEDGET 9MM X 4MM X 1.5MM